# Patient Record
Sex: FEMALE | Race: WHITE | NOT HISPANIC OR LATINO | Employment: OTHER | URBAN - METROPOLITAN AREA
[De-identification: names, ages, dates, MRNs, and addresses within clinical notes are randomized per-mention and may not be internally consistent; named-entity substitution may affect disease eponyms.]

---

## 2017-05-19 ENCOUNTER — OFFICE VISIT (OUTPATIENT)
Dept: NEUROLOGY | Facility: CLINIC | Age: 22
End: 2017-05-19
Payer: COMMERCIAL

## 2017-05-19 VITALS
WEIGHT: 210.13 LBS | SYSTOLIC BLOOD PRESSURE: 135 MMHG | BODY MASS INDEX: 33.77 KG/M2 | HEIGHT: 66 IN | DIASTOLIC BLOOD PRESSURE: 89 MMHG | HEART RATE: 113 BPM

## 2017-05-19 DIAGNOSIS — R42 DIZZINESS: ICD-10-CM

## 2017-05-19 DIAGNOSIS — G43.009 MIGRAINE WITHOUT AURA AND WITHOUT STATUS MIGRAINOSUS, NOT INTRACTABLE: Primary | ICD-10-CM

## 2017-05-19 DIAGNOSIS — R51.9 WORSENING HEADACHES: ICD-10-CM

## 2017-05-19 DIAGNOSIS — G44.86 CERVICOGENIC HEADACHE: ICD-10-CM

## 2017-05-19 DIAGNOSIS — R41.3 MEMORY DISTURBANCE: ICD-10-CM

## 2017-05-19 DIAGNOSIS — G47.9 TROUBLE IN SLEEPING: ICD-10-CM

## 2017-05-19 PROCEDURE — 99204 OFFICE O/P NEW MOD 45 MIN: CPT | Mod: S$GLB,,, | Performed by: NURSE PRACTITIONER

## 2017-05-19 PROCEDURE — 1160F RVW MEDS BY RX/DR IN RCRD: CPT | Mod: S$GLB,,, | Performed by: NURSE PRACTITIONER

## 2017-05-19 PROCEDURE — 99999 PR PBB SHADOW E&M-NEW PATIENT-LVL IV: CPT | Mod: PBBFAC,,, | Performed by: NURSE PRACTITIONER

## 2017-05-19 RX ORDER — TOPIRAMATE 25 MG/1
TABLET ORAL
Qty: 70 TABLET | Refills: 1 | Status: SHIPPED | OUTPATIENT
Start: 2017-05-19 | End: 2021-03-22

## 2017-05-19 RX ORDER — CITALOPRAM 20 MG/1
20 TABLET, FILM COATED ORAL DAILY
Refills: 0 | COMMUNITY
Start: 2017-03-14 | End: 2021-03-16

## 2017-05-19 RX ORDER — SUMATRIPTAN SUCCINATE 100 MG/1
TABLET ORAL
Refills: 3 | COMMUNITY
Start: 2017-05-03 | End: 2017-10-02 | Stop reason: ALTCHOICE

## 2017-05-19 RX ORDER — ETONOGESTREL AND ETHINYL ESTRADIOL .12; .015 MG/D; MG/D
INSERT, EXTENDED RELEASE VAGINAL
Refills: 6 | COMMUNITY
Start: 2017-05-03 | End: 2021-07-22

## 2017-05-19 RX ORDER — TOPIRAMATE 25 MG/1
TABLET ORAL
Refills: 0 | COMMUNITY
Start: 2017-03-14 | End: 2017-05-19 | Stop reason: ALTCHOICE

## 2017-05-19 RX ORDER — RIZATRIPTAN BENZOATE 10 MG/1
10 TABLET ORAL
Qty: 9 TABLET | Refills: 3 | Status: SHIPPED | OUTPATIENT
Start: 2017-05-19 | End: 2021-03-16

## 2017-05-19 RX ORDER — CLARITHROMYCIN 250 MG/1
250 TABLET, FILM COATED ORAL 2 TIMES DAILY
Refills: 0 | COMMUNITY
Start: 2017-03-15 | End: 2017-05-19 | Stop reason: ALTCHOICE

## 2017-05-19 RX ORDER — BENZONATATE 200 MG/1
CAPSULE ORAL
Refills: 0 | COMMUNITY
Start: 2017-03-15 | End: 2017-05-19 | Stop reason: ALTCHOICE

## 2017-05-19 RX ORDER — MONTELUKAST SODIUM 10 MG/1
10 TABLET ORAL DAILY
Refills: 0 | COMMUNITY
Start: 2017-03-15 | End: 2017-05-19 | Stop reason: ALTCHOICE

## 2017-05-19 NOTE — PROGRESS NOTES
Subjective:    Patient ID: Zaira Reeder   MRN: [id number]  Date: 05/19/2017    Referred By: Aaareferral Self    Chief Complaint: Consult      History of Present Illness:   21 y.o. female with ***,  presents *** for ***.     Onset ... Previous Hx of HA...Aura...Frequency ... Intensity (range)...Duration... #HD/month ... Last HA: ... Time & Mode of Onset... Location...Quality ... Radiation    Associated Symptoms:   · Brainstem Symptoms - vertigo, dysarthria, diplopia, ataxia, numbness/tingling (uni or bi), decreased LOC  · N, V, D, photo/phonophobia, tinnitus, scalp pain, changes in vision, scintillations, diplopia, eye pain, jaw pain, weakness, changes in concentration, worse with activity, neck pain     · Cluster HA - facial flushing/sweating, swollen/droopy eyelid, swelling around eyes, excessive tearing, rhinorrhea, nasal congestion, red eyes  · Increased ICP - whooshing sounds, pulsatile tinnitus, visual spots/blotches     Triggers - food, weather, etoh, menstrual cycle, environmental factors, smells, bright lights, smells, vigorous exercise, visual strain, heat, exertion     Aggravating Factors  Alleviating Factors  Head Trauma? Infection? Fever? Cancer? Pregnancy?  Recent Changes - sleep, exercise, weight, diet, meds, work, lifestyle      Sleep-    Treatments Tried and Response    Family Hx of Migraines, aneurysms, brain tumors     Positives in bold: Hx of Kidney Stones, asthma, GI bleed, osteoporosis, CAD/MI, CVA/TIA, DM      Social History***  Alcohol -   Smoke -  Recreational Drug Use-     Current Medications:    Current Outpatient Prescriptions:     benzonatate (TESSALON) 200 MG capsule, take 1 capsule by mouth three times a day if needed for cough, Disp: , Rfl: 0    citalopram (CELEXA) 20 MG tablet, Take 20 mg by mouth once daily., Disp: , Rfl: 0    clarithromycin (BIAXIN) 250 MG tablet, Take 250 mg by mouth 2 (two) times daily., Disp: , Rfl: 0    montelukast (SINGULAIR) 10 mg tablet, Take 10 mg  "by mouth once daily., Disp: , Rfl: 0    NUVARING 0.12-0.015 mg/24 hr vaginal ring, INSERT 1 RING INTO THE VAGINA EVERY MONTH LEAVE IN PLACE FOR 3 WEEKS, REMOVE FOR 1 WEEK, Disp: , Rfl: 6    sumatriptan (IMITREX) 100 MG tablet, TAKE 1 TABLET BY MOUTH ONCE A DAY AS NEEDED FOR MIGRAINE, Disp: , Rfl: 3    topiramate (TOPAMAX) 25 MG tablet, TAKE 1 TABLET BY MOUTH NIGHTLY AT BEDTIME FOR 1 WEEK THEN TAKE 2 TABLETS AT BEDTIME THEREAFTER, Disp: , Rfl: 0    Review of Systems     Objective:    Vitals:  /89  Pulse (!) 113  Ht 5' 6" (1.676 m)  Wt 95.3 kg (210 lb 1.6 oz)  BMI 33.91 kg/m2    Physical Exam   Constitutional:   She appears well-developed and well-nourished. She is well groomed  HENT:    Head: Atraumatic, oral and nasal mucosa intact.  Frontalis was ***, temporalis was ***   Eyes: Conjunctivae and EOM are normal. Pupils are equal, round, and reactive to light   Neck: Neck supple. No carotid bruit heard bilaterally.Occiput and trapezius NTTP***   Cardiovascular: Normal rate and normal heart sounds. No murmur heard.  Pulmonary/Chest: Effort normal and breath sounds normal  Musculoskeletal: Normal range of motion. No joint stiffness. No vertebral point tenderness.  Skin: Skin is warm and dry.  Psychiatric: Normal mood and affect.     Neuro exam:    Mental status:  The patient is awake, alert, and oriented to person, place and time.  Language is intact. Naming, repetition and fluency are clear and intact.   Remote and recent memory are intact  No findings to suggest executive dysfuntion    Patient has adequate insight    Mood is stable    Cranial Nerves:  Fundoscopic examination does not reveal any occult papilledema.    Pupils are equal and reactive to light.    Extraocular movements are intact and without nystagmus.    Visual fields are full to confrontation testing.   Facial movement is symmetric.  Facial sensation is intact.    Hearing is normal.   Uvula in midline. Tongue in midline without " fasiculation  FROM of neck in all (6) directions, shoulder shrug symmetrical.    Coordination:     Finger to nose - normal and symmetric bilaterally   Heel to shin test - normal and symmetric bilaterally     Motor:  Normal muscle bulk and symmetry. No fasciculations were noted.   Tremor/pronator drift ***apparent.    strength and finger extension strength was ***  RUE:appropriate against gravity and medium force as tested ***/5  LUE: appropriate against gravity and medium force as tested ***/5  RLE:appropriate against gravity and medium force as tested ***/5              LLE: appropriate against gravity and medium force as tested ***/5    Reflexes:  Right Brachioradialis {0, 1+, 2+, 3+, 4+, TRACE:19280}  Left Brachioradialis {0, 1+, 2+, 3+, 4+, TRACE:54543}  Right Biceps {0, 1+, 2+, 3+, 4+, TRACE:80030}  Left Biceps {0, 1+, 2+, 3+, 4+, TRACE:93230}  Right Triceps {0, 1+, 2+, 3+, 4+, TRACE:34115}  Left Triceps {0, 1+, 2+, 3+, 4+, TRACE:07371}  Right Patellar{0, 1+, 2+, 3+, 4+, TRACE:86864}  Left Patellar {0, 1+, 2+, 3+, 4+, TRACE:29768}  Right Achilles {0, 1+, 2+, 3+, 4+, TRACE:09230}  Left Achilles {0, 1+, 2+, 3+, 4+, TRACE:97890}                          Plantar flexion bilat   Finn was ***     Sensory:  RUE  {INTACT/DEFICIT:41379} {sensation:11875}  LUE {INTACT/DEFICIT:01519} {sensation:61388}    RLE {INTACT/DEFICIT:67057} {sensation:20043}  LLE {INTACT/DEFICIT:39334} {sensation:54485}    Gait:   Romberg - negative   Normal gait  Tandem, Heel, and Toe Walk -     Review of Data:   Labs:  Imaging:  Note: I have independently reviewed any/all imaging/labs/tests and agree with the report (s) as documented.  Any discrepancies will be as noted/demarcated by free text.  CATARINO NP 5/19/2017      Assessment:  Zaira Reeder is a 21 y.o. female          There are no diagnoses linked to this encounter.      Plan:  I have discussed realistic goals of care with patient at length as well as medication options, and need  for lifestyle adjustment. I have explained that treatment will take time. We have agreed that the goal will be to reduce frequency/intensity/quantity of HA, not to be completely HA free. I have explained my non narcotic policy regarding headache treatment.    Patient to track frequency of headaches.  Patient agreeable to work on lifestyle adjustments.    Discussed potential for teratogenicity with treatment, patient understands if her family planning status should change she will contact office immediately and we will safely adjust medications as needed.     All questions and concerns addressed.  Patient verbalizes understanding and is agreeable to the plan.        *** was directly involved in this patient's care.  He was briefed, then directly conducted an independent H&P based on the above findings.  His input to follow through the EMR.      ***, was available during today's encounter. Any change to plan will appear in the EMR          CC: Primary Doctor Rere Petit, FNP-C  [facility]  [phone]

## 2017-05-19 NOTE — PROGRESS NOTES
Subjective:    Patient ID: Zaira Reeder   MRN: [id number]  Date: 05/22/2017    Referred By: Aaareferral Self    Chief Complaint: Consult    History of Present Illness:   21 y.o. female with TMJ,  presents alone for evaluation of headaches.  States she has had migraines for years, and has always taken Imitrex which made her migraines go away.   Was started on Topamax 25 mg two years ago by doctor in [place].  Current episode has been going on since last Thursday:  Thursday - started with migraine, right frontal pain radiating to right occipitalis, took an imitrex, went to bed   Fri- woke up and migraine continued   Saturday - woke up migraine was no longer there, by 10-ish migraine had returned ferociously, took an imitrex and went to take a nap, went to the [facility], she drove, she was at graduation, felt hot, stood up to go get a water, felt like her vision turned white, had an exacerbation of her pain (rated 1000/10), had tunnel vision, and passed out, her  caught her, then went home and slept     Sunday - was speaking to her parents, and they felt she was not speaking appropriately, and migraine continued.  Went to [facility] and got a Toradol and phenergan shot, she sat there for 10-15 minutes then felt better, vision in her right eye was blurry. Was totally fine until that night 9 PM went to grocery store and passed out again.  Denies hitting head on either passing out event.        Since Sunday, she has felt mentally and physically exhausted since, has been unable to go to work all week   Has had a constant dull pain in her head since     Typical migraine pain - is right frontal pain radiating to right occiput, throbbing lasting a few hours associated with some nausea, sensitivity to light    Associated Symptoms - pushes on right temple, jaw pain - always relieved with sumatriptan 100 mg x 1   Triggers - unable to identify     Typical migraine symptoms - pain on right side, right  frontal/occipital, throbbing      Aggravating Factors - stress, change in sleep pattern   Alleviating Factors - sleep, imitrex   Head Trauma? Infection? Fever? Cancer? Pregnancy? <-- NONE  Recent Changes - sleep, exercise, weight, diet, meds, work, lifestyle  <-- new job, is no longer working nights     Sleep- sleeps well, snores (both birth sisters have SARIAH)    Treatments Tried and Response  Topamax - stopped taking medication and migraines have increased in frequency and duration   Sumatriptan - lost efficacy     2016 - 2 migraines all year (on Topamax 25 mg)   January 2017 - increased stress - 2 migraines   February - 0 March - 0 (stopped taking Topamax 25 mg in March because she felt it was not helping)  April- 5 migraines   May 5-6 migraines     Family Hx of Migraines, aneurysms, brain tumors <-- adopted/ unsure of family history      Positives in bold: Hx of Kidney Stones, asthma, GI bleed (ulcers), osteoporosis, CAD/MI, CVA/TIA, DM      Social History  Alcohol - denies  Smoke - denies  Recreational Drug Use- denies   No caffeine     Current Medications:    citalopram (CELEXA) 20 MG tablet, Take 20 mg by mouth once daily., Disp: , Rfl: 0    NUVARING 0.12-0.015 mg/24 hr vaginal ring, INSERT 1 RING INTO THE VAGINA EVERY MONTH LEAVE IN PLACE FOR 3 WEEKS, REMOVE FOR 1 WEEK, Disp: , Rfl: 6    rizatriptan (MAXALT) 10 MG tablet, Take 1 tablet (10 mg total) by mouth as needed for Migraine., Disp: 9 tablet, Rfl: 3    sumatriptan (IMITREX) 100 MG tablet, TAKE 1 TABLET BY MOUTH ONCE A DAY AS NEEDED FOR MIGRAINE, Disp: , Rfl: 3    topiramate (TOPAMAX) 25 MG tablet, 1 tab PM x 1 week, 2 tabs PM x 1 week, 3 tabs PM x 1 week, 4 tabs PM x 1 week., Disp: 70 tablet, Rfl: 1    Review of Systems   Review of Systems   Constitutional: Positive for activity change and fatigue. Negative for chills, diaphoresis and fever.   HENT: Negative for congestion, ear pain, facial swelling, hearing loss, rhinorrhea, sinus pressure,  "tinnitus and voice change.    Eyes: Positive for photophobia and visual disturbance. Negative for pain, discharge and redness.   Respiratory: Negative for chest tightness and shortness of breath.    Cardiovascular: Negative for chest pain and palpitations.   Gastrointestinal: Positive for nausea. Negative for vomiting.   Genitourinary: Negative for difficulty urinating and dysuria.   Musculoskeletal: Positive for gait problem (feels she has been leaning towards the right), neck pain and neck stiffness. Negative for back pain and myalgias.   Skin: Negative for pallor.   Allergic/Immunologic: Negative for immunocompromised state.   Neurological: Positive for dizziness, syncope, light-headedness and headaches. Negative for facial asymmetry, speech difficulty, weakness and numbness.   Psychiatric/Behavioral: Positive for dysphoric mood and sleep disturbance (snores). Negative for agitation, behavioral problems, confusion, decreased concentration and hallucinations. The patient is nervous/anxious. The patient is not hyperactive.      Objective:    Vitals:  /89   Pulse (!) 113   Ht 5' 6" (1.676 m)   Wt 95.3 kg (210 lb 1.6 oz)   BMI 33.91 kg/m²     Physical Exam   Constitutional:   She appears well-developed and well-nourished. She is well groomed  HENT:    Head: Atraumatic, oral and nasal mucosa intact.  Frontalis was NTTP, temporalis was NTTP   Eyes: Conjunctivae and EOM are normal. Pupils are equal, round, and reactive to light   Neck: Neck supple. Occiput and trapezius TTP on right side only   Cardiovascular: Normal rate and normal heart sounds. No murmur heard.  Pulmonary/Chest: Effort normal and breath sounds normal  Musculoskeletal: Normal range of motion. No joint stiffness. No vertebral point tenderness.  Skin: Skin is warm and dry.  Psychiatric: Normal mood and affect.     Neuro exam:    Mental status:  The patient is awake, alert, and oriented to person, place and time.  Language is intact.   Remote " and recent memory are intact  Patient has adequate insight    Mood is stable    Cranial Nerves:  Fundoscopic examination does not reveal any occult papilledema.    Pupils are equal and reactive to light.    Extraocular movements are intact and without nystagmus.    Visual fields are full to confrontation testing.   Facial movement is symmetric.  Facial sensation is intact.    Hearing is normal.   Uvula in midline. Mallampati III. Tongue in midline without fasiculation  DROM of neck in all (6) directions, secondary to soreness/pain  shoulder shrug symmetrical.    Coordination:     Finger to nose - normal and symmetric bilaterally   Heel to shin test - normal and symmetric bilaterally     Motor:  Normal muscle bulk and symmetry. No fasciculations were noted.   Tremor/pronator drift not apparent.    strength and finger extension strength was strong and symmetric   RUE:appropriate against gravity and medium force as tested 5/5  LUE: appropriate against gravity and medium force as tested 5/5  RLE:appropriate against gravity and medium force as tested 5/5              LLE: appropriate against gravity and medium force as tested 5/5    Reflexes:  Right Brachioradialis 2+  Left Brachioradialis 2+  Right Biceps 2+  Left Biceps 2+  Right Triceps 2+  Left Triceps 2+  Right Patellar2+  Left Patellar 2+  Right Achilles 2+  Left Achilles 2+                          Plantar flexion bilat   Finn was negative      Sensory:  RUE  intact light touch and temperature  LUE intact light touch and temperature    RLE intact light touch and temperature  LLE intact light touch and temperature    Gait:   Romberg - slight sway   Normal gait  Tandem, Heel, and Toe Walk - slight sway on tandem walk       Assessment:  Zaira Reeder is a 21 y.o. female with a long standing history of migraines.  Was previously taking Topamax 25 mg, discontinued medication two months ago and migraines have increased in frequency and intensity since.  Had  recent change in migraine pattern with 2 syncopal episodes, will order MRI Brain w/wo contrast to rule out secondary cause for change in migraines.      Diagnoses and all orders for this visit:    Migraine without aura and without status migrainosus, not intractable  -     rizatriptan (MAXALT) 10 MG tablet; Take 1 tablet (10 mg total) by mouth as needed for Migraine.  -     topiramate (TOPAMAX) 25 MG tablet; 1 tab PM x 1 week, 2 tabs PM x 1 week, 3 tabs PM x 1 week, 4 tabs PM x 1 week.  -     MRI Brain W WO Contrast; Future  -     CBC auto differential; Future  -     Comprehensive metabolic panel; Future  -     TSH; Future  -     T4, FREE; Future  -     T3, FREE; Future  -     MAGNESIUM; Future  -     VITAMIN D; Future  -     Ambulatory consult to Sleep Disorders  -     Ambulatory Referral to Physical/Occupational Therapy    Worsening headaches  -     MRI Brain W WO Contrast; Future  -     Ambulatory consult to Sleep Disorders    Trouble in sleeping  -     Ambulatory consult to Sleep Disorders        - Snores - has two birth sisters with sleep apnea     Dizziness  -     MRI Brain W WO Contrast; Future    Memory disturbance  -     Ambulatory consult to Sleep Disorders    Cervicogenic headache  -     Ambulatory Referral to Physical/Occupational Therapy    Plan:  - MRI Brain W/WO Contrast  - D/C Imitrex as no longer effective   - For abortive - maxalt 10mg tab at onset of migraine, can repeat in 2 hours if needed   No more than 2 tabs/day or for over 3 days/week   - For preventive - restart Topamax 25 mg    Week 1 - 1 tab at night   Week 2 - 2 tabs at night   Week 3 - 3 tabs at night   Week 4 - 4 tabs at night   - Having trouble with sleep, snores - referral to Sleep Disorders placed   - For complaints of neck pain/stiffness - agreeable to physical therapy for neck pain   - Follow-up in 2 months or as needed     Future Considerations - FLETCHER    I have discussed realistic goals of care with patient at length as well as  medication options, and need for lifestyle adjustment. I have explained that treatment will take time. We have agreed that the goal will be to reduce frequency/intensity/quantity of HA, not to be completely HA free. I have explained my non narcotic policy regarding headache treatment.    Patient to track frequency of headaches.  Patient agreeable to work on lifestyle adjustments.    Discussed potential for teratogenicity with treatment, patient understands if her family planning status should change she will contact office immediately and we will safely adjust medications as needed. Currently utilizing Nuva Ring for birth control.      All questions and concerns addressed.  Patient verbalizes understanding and is agreeable to the plan.     Dr. Strickland was available during today's encounter. Any change to plan will appear in the EMR       SHANIKA Santiago-C  [facility]  [phone]

## 2017-05-19 NOTE — MR AVS SNAPSHOT
[facility]  [address]  [facility] [address]  Phone: [phone]  Fax: [phone]                  Zaira Reeder   2017 8:00 AM   Office Visit    Description:  Female : 1995   Provider:  SHANIKA Santiago   Department:  [facility]           Reason for Visit     Consult           Diagnoses this Visit        Comments    Migraine without aura and without status migrainosus, not intractable    -  Primary     Worsening headaches         Trouble in sleeping         Dizziness         Memory disturbance         Cervicogenic headache                To Do List           Future Appointments        Provider Department Dept Phone    2017 1:30 PM [facility] MRI1 [facility] [phone]    2017 10:30 AM SHANIKA Santiago - Neurology [phone]    2017 2:00 PM Debra Leroy MD [facility] [phone]      Goals (5 Years of Data)     None       These Medications        Disp Refills Start End    rizatriptan (MAXALT) 10 MG tablet 9 tablet 3 2017    Take 1 tablet (10 mg total) by mouth as needed for Migraine. - Oral    [facility] - [address] Ph #: [phone]       topiramate (TOPAMAX) 25 MG tablet 70 tablet 1 2017     1 tab PM x 1 week, 2 tabs PM x 1 week, 3 tabs PM x 1 week, 4 tabs PM x 1 week.    Ph[facility]: [facility] - [address] Ph #: [phone]         [facility]     [facility] Care [facility] -  Assistance  Unless otherwise directed by your provider, please contact [facility], our nurse care line that is available for  assistance.     Registered nurses in the [facility] provide: appointment scheduling, clinical advisement, health education, and other advisory services.  Call: [phone] (toll free)               Medications           Message  "regarding Medications     Verify the changes and/or additions to your medication regime listed below are the same as discussed with your clinician today.  If any of these changes or additions are incorrect, please notify your healthcare provider.        START taking these NEW medications        Refills    rizatriptan (MAXALT) 10 MG tablet 3    Sig: Take 1 tablet (10 mg total) by mouth as needed for Migraine.    Class: Normal    Route: Oral    topiramate (TOPAMAX) 25 MG tablet 1    Si tab PM x 1 week, 2 tabs PM x 1 week, 3 tabs PM x 1 week, 4 tabs PM x 1 week.    Class: Normal      STOP taking these medications     benzonatate (TESSALON) 200 MG capsule take 1 capsule by mouth three times a day if needed for cough    clarithromycin (BIAXIN) 250 MG tablet Take 250 mg by mouth 2 (two) times daily.    montelukast (SINGULAIR) 10 mg tablet Take 10 mg by mouth once daily.           Verify that the below list of medications is an accurate representation of the medications you are currently taking.  If none reported, the list may be blank. If incorrect, please contact your healthcare provider. Carry this list with you in case of emergency.           Current Medications     citalopram (CELEXA) 20 MG tablet Take 20 mg by mouth once daily.    NUVARING 0.12-0.015 mg/24 hr vaginal ring INSERT 1 RING INTO THE VAGINA EVERY MONTH LEAVE IN PLACE FOR 3 WEEKS, REMOVE FOR 1 WEEK    rizatriptan (MAXALT) 10 MG tablet Take 1 tablet (10 mg total) by mouth as needed for Migraine.    sumatriptan (IMITREX) 100 MG tablet TAKE 1 TABLET BY MOUTH ONCE A DAY AS NEEDED FOR MIGRAINE    topiramate (TOPAMAX) 25 MG tablet 1 tab PM x 1 week, 2 tabs PM x 1 week, 3 tabs PM x 1 week, 4 tabs PM x 1 week.           Clinical Reference Information           Your Vitals Were     BP Pulse Height Weight BMI    135/89 113 5' 6" (1.676 m) 95.3 kg (210 lb 1.6 oz) 33.91 kg/m2      Blood Pressure          Most Recent Value    BP  135/89      Allergies as of " 5/19/2017     No Known Allergies      Immunizations Administered on Date of Encounter - 5/19/2017     None      Orders Placed During Today's Visit      Normal Orders This Visit    Ambulatory consult to Sleep Disorders     Ambulatory Referral to Physical/Occupational Therapy     Future Labs/Procedures Expected by Expires    CBC auto differential  5/19/2017 7/18/2018    Comprehensive metabolic panel  5/19/2017 7/18/2018    MAGNESIUM  5/19/2017 7/18/2018    MRI Brain W WO Contrast  5/19/2017 5/19/2018    T3, FREE  5/19/2017 7/18/2018    T4, FREE  5/19/2017 7/18/2018    TSH  5/19/2017 7/18/2018    VITAMIN D  5/19/2017 7/18/2018      [facility] Sign-Up     Activating your [facility] account is as easy as 1-2-3!     1) Visit [email or web address], select Sign Up Now, enter this activation code and your date of birth, then select Next.  [email or web address]  Expires: 7/3/2017  9:31 AM      2) Create a username and password to use when you visit [facility] in the future and select a security question in case you lose your password and select Next.    3) Enter your e-mail address and click Sign Up!    Additional Information  If you have questions, please e-mail [email or web address] or call [phone] to talk to our [facility] staff. Remember, [facility] is NOT to be used for urgent needs. For medical emergencies, dial 911.         Language Assistance Services     ATTENTION: Language assistance services are available, free of charge. Please call [phone].      ATENCIÓN: Si habla español, tiene a soto disposición servicios gratuitos de asistencia lingüística. Llame al [phone].     IGLESIA Ý: N?u b?n nói Ti?ng Vi?t, có các d?ch v? h? tr? ngôn ng? mi?n phí dành cho b?n. G?i s? [phone].         [facility] complies with applicable Federal civil rights laws and does not discriminate on the basis of race, color, national origin, age, disability, or sex.

## 2017-05-26 ENCOUNTER — HOSPITAL ENCOUNTER (EMERGENCY)
Facility: HOSPITAL | Age: 22
Discharge: HOME | End: 2017-05-27
Attending: EMERGENCY MEDICINE | Admitting: EMERGENCY MEDICINE
Payer: COMMERCIAL

## 2017-05-26 VITALS
BODY MASS INDEX: 33.75 KG/M2 | HEART RATE: 120 BPM | OXYGEN SATURATION: 97 % | HEIGHT: 66 IN | TEMPERATURE: 99 F | RESPIRATION RATE: 19 BRPM | WEIGHT: 210 LBS

## 2017-05-26 DIAGNOSIS — S93.401A MILD ANKLE SPRAIN, RIGHT, INITIAL ENCOUNTER: Primary | ICD-10-CM

## 2017-05-26 DIAGNOSIS — S99.911A ANKLE INJURY, RIGHT, INITIAL ENCOUNTER: ICD-10-CM

## 2017-05-26 PROCEDURE — 99283 EMERGENCY DEPT VISIT LOW MDM: CPT

## 2017-05-26 PROCEDURE — 99283 EMERGENCY DEPT VISIT LOW MDM: CPT | Mod: ,,, | Performed by: EMERGENCY MEDICINE

## 2017-05-26 RX ORDER — IBUPROFEN 600 MG/1
600 TABLET ORAL
Status: DISCONTINUED | OUTPATIENT
Start: 2017-05-27 | End: 2017-05-26

## 2017-05-26 RX ORDER — ACETAMINOPHEN 500 MG
1000 TABLET ORAL
Status: COMPLETED | OUTPATIENT
Start: 2017-05-27 | End: 2017-05-27

## 2017-05-27 PROCEDURE — 25000003 PHARM REV CODE 250: Performed by: EMERGENCY MEDICINE

## 2017-05-27 RX ADMIN — ACETAMINOPHEN 1000 MG: 500 TABLET ORAL at 12:05
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
## 2017-06-06 ENCOUNTER — HOSPITAL ENCOUNTER (OUTPATIENT)
Dept: RADIOLOGY | Facility: HOSPITAL | Age: 22
Discharge: HOME | End: 2017-06-06
Attending: NURSE PRACTITIONER
Payer: COMMERCIAL

## 2017-06-06 DIAGNOSIS — R42 DIZZINESS: ICD-10-CM

## 2017-06-06 DIAGNOSIS — G43.009 MIGRAINE WITHOUT AURA AND WITHOUT STATUS MIGRAINOSUS, NOT INTRACTABLE: ICD-10-CM

## 2017-06-06 DIAGNOSIS — R51.9 WORSENING HEADACHES: ICD-10-CM

## 2017-06-06 PROCEDURE — A9585 GADOBUTROL INJECTION: HCPCS | Performed by: NURSE PRACTITIONER

## 2017-06-06 PROCEDURE — 70553 MRI BRAIN STEM W/O & W/DYE: CPT | Mod: TC

## 2017-06-06 PROCEDURE — 25500020 PHARM REV CODE 255: Performed by: NURSE PRACTITIONER

## 2017-06-06 PROCEDURE — 70553 MRI BRAIN STEM W/O & W/DYE: CPT | Mod: 26,,, | Performed by: RADIOLOGY

## 2017-06-06 RX ORDER — GADOBUTROL 604.72 MG/ML
10 INJECTION INTRAVENOUS
Status: COMPLETED | OUTPATIENT
Start: 2017-06-06 | End: 2017-06-06

## 2017-06-06 RX ADMIN — GADOBUTROL 10 ML: 604.72 INJECTION INTRAVENOUS at 02:06

## 2017-07-06 ENCOUNTER — OFFICE VISIT (OUTPATIENT)
Dept: NEUROLOGY | Facility: CLINIC | Age: 22
End: 2017-07-06
Payer: COMMERCIAL

## 2017-07-06 VITALS
HEART RATE: 119 BPM | WEIGHT: 202.63 LBS | SYSTOLIC BLOOD PRESSURE: 136 MMHG | DIASTOLIC BLOOD PRESSURE: 95 MMHG | HEIGHT: 66 IN | BODY MASS INDEX: 32.56 KG/M2

## 2017-07-06 DIAGNOSIS — G44.86 CERVICOGENIC HEADACHE: ICD-10-CM

## 2017-07-06 DIAGNOSIS — G43.009 MIGRAINE WITHOUT AURA AND WITHOUT STATUS MIGRAINOSUS, NOT INTRACTABLE: Primary | ICD-10-CM

## 2017-07-06 DIAGNOSIS — G47.9 TROUBLE IN SLEEPING: ICD-10-CM

## 2017-07-06 PROCEDURE — 96372 THER/PROPH/DIAG INJ SC/IM: CPT | Mod: S$GLB,,, | Performed by: NURSE PRACTITIONER

## 2017-07-06 PROCEDURE — 99999 PR PBB SHADOW E&M-EST. PATIENT-LVL III: CPT | Mod: PBBFAC,,, | Performed by: NURSE PRACTITIONER

## 2017-07-06 PROCEDURE — 99213 OFFICE O/P EST LOW 20 MIN: CPT | Mod: 25,S$GLB,, | Performed by: NURSE PRACTITIONER

## 2017-07-06 RX ORDER — TOPIRAMATE 100 MG/1
100 TABLET, FILM COATED ORAL DAILY
Qty: 30 TABLET | Refills: 5 | Status: SHIPPED | OUTPATIENT
Start: 2017-07-06 | End: 2021-03-16 | Stop reason: SDUPTHER

## 2017-07-06 RX ORDER — TOPIRAMATE 50 MG/1
50 TABLET, FILM COATED ORAL 2 TIMES DAILY
Qty: 60 TABLET | Refills: 5 | Status: SHIPPED | OUTPATIENT
Start: 2017-07-06 | End: 2018-07-06

## 2017-07-06 RX ORDER — ELETRIPTAN HYDROBROMIDE 40 MG/1
40 TABLET, FILM COATED ORAL
Qty: 9 TABLET | Refills: 5 | Status: SHIPPED | OUTPATIENT
Start: 2017-07-06 | End: 2017-07-27 | Stop reason: SDUPTHER

## 2017-07-06 RX ORDER — KETOROLAC TROMETHAMINE 30 MG/ML
60 INJECTION, SOLUTION INTRAMUSCULAR; INTRAVENOUS
Status: COMPLETED | OUTPATIENT
Start: 2017-07-06 | End: 2017-07-06

## 2017-07-06 RX ADMIN — KETOROLAC TROMETHAMINE 60 MG: 30 INJECTION, SOLUTION INTRAMUSCULAR; INTRAVENOUS at 11:07

## 2017-07-06 NOTE — PROGRESS NOTES
Established Patient   SUBJECTIVE:  Patient ID: Zaira Reeder is a 22 y.o. female.  Chief Complaint: Follow-up    History of Present Illness:  Zaira Reeder is a 22 y.o. female with history of TMJ, who presents to the clinic with a friend for follow-up of headaches.     Recommendations made at last Office Visit on 5/19/2017:  - MRI Brain W/WO Contrast  - Lab work ordered   - D/C Imitrex as no longer effective   - For abortive - maxalt 10 mg tab at onset of migraine, can repeat in 2 hours if needed   No more than 2 tabs/day or for over 3 days/week   - For preventive - restart Topamax 25 mg - titrate to 100 mg nightly   - Referral to Sleep disorders  - Referral to PT    - Follow-up in 2 months or as needed     07/06/2017 - Interval History:  - MRI Brain normal   - Labwork not completed   - Maxalt 10 mg does not work   - For prevention - is taking Topiramate 100 mg daily   - Has had 5 migraines since last visit, each lasting 2-3 days   - Is currently in Physical Therapy for her neck - states she is going twice per week and has home excercises to do daily   - Has appointment to see sleep medicine on 7/11/17   - States her neck feels very heavy despite physical therapy   - Has elevated BP and HR in clinic today - per patient she states she monitors her BP/HR regularly and states it is usually within normal limits, denies chest pain   - Is complaining of 5/10 pain in office - requests nerve block to be done, will schedule appointment in 2 weeks for her to come back for nerve block as she was 12 minutes late to her 30 min appt - she is agreeable to this      Treatments Tried and Response  Topamax 100 mg - helping   Sumatriptan - lost efficacy   Maxalt - no help   Relpax - prescribed today   Topamax 200 mg - recommended today     Initial CAMERON HPI:  21 y.o. female with TMJ,  presents alone for evaluation of headaches.  States she has had migraines for years, and has always taken Imitrex which made her migraines go  away.   Was started on Topamax 25 mg two years ago by doctor in [place].  Current episode has been going on since last Thursday:  Thursday - started with migraine, right frontal pain radiating to right occipitalis, took an imitrex, went to bed   Fri- woke up and migraine continued   Saturday - woke up migraine was no longer there, by 10-philomena migraine had returned ferociously, took an imitrex and went to take a nap, went to the [facility], she drove, she was at graduation, felt hot, stood up to go get a water, felt like her vision turned white, had an exacerbation of her pain (rated 1000/10), had tunnel vision, and passed out, her  caught her, then went home and slept     Sunday - was speaking to her parents, and they felt she was not speaking appropriately, and migraine continued.  Went to [facility] and got a Toradol and phenergan shot, she sat there for 10-15 minutes then felt better, vision in her right eye was blurry. Was totally fine until that night 9 PM went to grocery store and passed out again.  Denies hitting head on either passing out event.        Since Sunday, she has felt mentally and physically exhausted since, has been unable to go to work all week . Has had a constant dull pain in her head since   Typical migraine pain - is right frontal pain radiating to right occiput, throbbing lasting a few hours associated with some nausea, sensitivity to light  Associated Symptoms - pushes on right temple, jaw pain - always relieved with sumatriptan 100 mg x 1   Triggers - unable to identify   Typical migraine symptoms - pain on right side, right frontal/occipital, throbbing   Aggravating Factors - stress, change in sleep pattern   Alleviating Factors - sleep, imitrex   Head Trauma? Infection? Fever? Cancer? Pregnancy? <-- NONE  Recent Changes - sleep, exercise, weight, diet, meds, work, lifestyle  <-- new job, is no longer working nights   Sleep- sleeps well, snores (both birth sisters have  "SARIAH)    Headache Timeline:  2016 - 2 migraines all year (on Topamax 25 mg)   January 2017 - increased stress - 2 migraines   February 2017- 0  March 2017- 0 (stopped taking Topamax 25 mg in March because she felt it was not helping)  April 2017-  5 migraines   May 2017 - 5-6 migraines   June 2017 -  6 migraines     Current Medications:     citalopram (CELEXA) 20 MG tablet, Take 20 mg by mouth once daily., Disp: , Rfl: 0    NUVARING 0.12-0.015 mg/24 hr vaginal ring, INSERT 1 RING INTO THE VAGINA EVERY MONTH LEAVE IN PLACE FOR 3 WEEKS, REMOVE FOR 1 WEEK, Disp: , Rfl: 6    sumatriptan (IMITREX) 100 MG tablet, TAKE 1 TABLET BY MOUTH ONCE A DAY AS NEEDED FOR MIGRAINE, Disp: , Rfl: 3    topiramate (TOPAMAX) 25 MG tablet, 1 tab PM x 1 week, 2 tabs PM x 1 week, 3 tabs PM x 1 week, 4 tabs PM x 1 week., Disp: 70 tablet, Rfl: 1    eletriptan (RELPAX) 40 MG tablet, Take 1 tablet (40 mg total) by mouth as needed. may repeat in 2 hours if necessary, Disp: 9 tablet, Rfl: 5    rizatriptan (MAXALT) 10 MG tablet, Take 1 tablet (10 mg total) by mouth as needed for Migraine., Disp: 9 tablet, Rfl: 3    topiramate (TOPAMAX) 100 MG tablet, Take 1 tablet (100 mg total) by mouth once daily., Disp: 30 tablet, Rfl: 5    topiramate (TOPAMAX) 50 MG tablet, Take 1 tablet (50 mg total) by mouth 2 (two) times daily., Disp: 60 tablet, Rfl: 5    OBJECTIVE:  Vitals:  BP (!) 136/95   Pulse (!) 119   Ht 5' 6" (1.676 m)   Wt 91.9 kg (202 lb 9.6 oz)   BMI 32.70 kg/m²     Physical Exam:  Constitutional:   She appears well-developed and well-nourished. She is well groomed.  NAD.  Complaining of 5/10 pain, laughing and joking with friend in office   HENT:    Head: Normocephalic and atraumatic, oral and nasal mucosa intact.  Frontalis was NTTP, temporalis was NTTP   Eyes: Conjunctivae and EOM are normal.   Musculoskeletal: Normal range of motion. No joint stiffness. No vertebral point tenderness.  Skin: Skin is warm and dry.  Psychiatric: Normal " "mood and affect.     Neuro exam:    Mental status:  The patient is awake, alert, and oriented to person, place and time.  Language is intact and fluent  Remote and recent memory are intact  Normal attention and concentration  Mood is stable    Cranial Nerves:  Extraocular movements are intact and without nystagmus.    Visual fields are full to confrontation testing.   Facial movement is symmetric.  Facial sensation is intact.    Hearing is intact to finger rub   Shoulder shrug symmetrical.    Motor:  Normal muscle bulk and symmetry. No fasciculations were noted.   RUE:appropriate against gravity and medium force as tested 5/5  LUE: appropriate against gravity and medium force as tested 5/5  RLE:appropriate against gravity and medium force as tested 5/5              LLE: appropriate against gravity and medium force as tested 5/5    Reflexes:  Right Patellar2+  Left Patellar 2+                          Sensory:  RUE  intact light touch and temperature  LUE intact light touch and temperature    RLE intact light touch and temperature  LLE intact light touch and temperature    Gait Normal     Review of Data:   Labs:  Did not have labwork done   Imagin2017 - MRI Brain W/WO Contrast   Radiology Impression - "No evidence of acute intracranial pathology.  Electronically signed by resident: DARLENE PINK MD. Date: 17. Time: 16:06  As the supervising and teaching physician, I personally reviewed the images and resident's interpretation and I agree with the findings.  Electronically signed by: CHELSEY DOMINGUEZ MD. Date: 17. Time: 16:26"     Note: I have independently reviewed any/all imaging/labs/tests and agree with the report (s) as documented.  Any discrepancies will be as noted/demarcated by free text.  SIENNA EASLEY 2017    Focused examination was undertaken today.     ASSESSMENT:  1. Migraine without aura and without status migrainosus, not intractable    2. Trouble in sleeping    3. Cervicogenic " headache      PLAN:  - Increase dose of Topamax    Continue 100 mg nightly    Add 25 mg tablet in the morning    After 2 weeks, can increase morning dose to 50 mg if no benefit, but no side effects   - toradol 60 mg IM injection administered in clinic - she tolerated injection well - no adverse effects present after 10 minutes of monitoring   - D/C Maxalt   - For abortive therapy - relpax 40 mg    Take at onset of migraine, can repeat in 2 hrs if needed.  No more than 2 tabs/day or 3 days/week   - Continue to monitor BP and HR - may consider addition of propranolol for migraine prevention if tachycardia persists   - Keep appointment with sleep medicine next week   - Encouraged her to have labwork completed prior to next office visit   - Continue tracking headaches   - Discussed goals of therapy are to decrease the frequency, intensity, and duration of headaches  - RTC in 2 weeks for nerve block and 2 months for follow-up     Orders Placed This Encounter    eletriptan (RELPAX) 40 MG tablet    topiramate (TOPAMAX) 100 MG tablet    topiramate (TOPAMAX) 50 MG tablet    ketorolac injection 60 mg     Discussed potential for teratogenicity with treatment, patient understands if her family planning status should change she will contact office immediately and we will safely adjust medications as needed.  She uses NuvaRing for contraception and states she and her  do not want to get pregnant at this time. Discussed addition of second form of contraception with increased dose of Topamax as this has the potential to decrease the effectiveness of NuvaRing.  She is agreeable.    I spent 15 minutes face-to-face with the patient with >50% of the time spent with counseling and education regarding:  - results of data, diagnosis, and recommendations stated above  - risks and benefits of relpax and increased dose of topamax   - importance of monitoring BP/HR regularly   - importance of her keeping her appt with sleep  medicine as she has a strong family history of sleep apnea - untreated SARIAH could be contributing to her headaches and elevated BP/HR   - importance of healthy diet, regular exercise and sleep hygiene in the treatment of headaches      The patient verbalizes understanding and agreement with the treatment plan. Questions were sought and answered to her stated verbal satisfaction.      Stephie Petit, LEATHAP-C  [facility]   [phone]    Dr. Strickland was available during today's encounter.

## 2017-07-11 ENCOUNTER — OFFICE VISIT (OUTPATIENT)
Dept: SLEEP MEDICINE | Facility: CLINIC | Age: 22
End: 2017-07-11
Payer: COMMERCIAL

## 2017-07-11 VITALS
DIASTOLIC BLOOD PRESSURE: 77 MMHG | HEIGHT: 66 IN | BODY MASS INDEX: 32.34 KG/M2 | WEIGHT: 201.25 LBS | HEART RATE: 114 BPM | SYSTOLIC BLOOD PRESSURE: 118 MMHG

## 2017-07-11 DIAGNOSIS — G47.33 OSA (OBSTRUCTIVE SLEEP APNEA): Primary | ICD-10-CM

## 2017-07-11 PROCEDURE — 99204 OFFICE O/P NEW MOD 45 MIN: CPT | Mod: S$GLB,,, | Performed by: PSYCHIATRY & NEUROLOGY

## 2017-07-11 PROCEDURE — 99999 PR PBB SHADOW E&M-EST. PATIENT-LVL III: CPT | Mod: PBBFAC,,, | Performed by: PSYCHIATRY & NEUROLOGY

## 2017-07-11 NOTE — PROGRESS NOTES
Zaira Reeder  was seen at the request of  Stephie Petit FNP for sleep evaluation.    07/11/2017 INITIAL HISTORY OF PRESENT ILLNESS:  Zaira Reeder is a 22 y.o. female is here to be evaluated for a sleep disorder.       CHIEF COMPLAINT:      The patient's complaints include excessive daytime sleepiness, excessive daytime fatigue, snoring,  gasping for air in sleep and interrupted sleep since  Last year.    Gained 50 lbs.    Had TIA - like episode - incoherent speech, rt eye vision loss, dizziness (falling to the right) + bad headache in May 2017 that resolved spontaneously same day.     Previous h/o migraine    Reports  dry mouth and sore throat  Reports nasal congestion   Denies  morning headaches  Denies  interrupted sleep  Denies frequent leg movements  Denies symptoms concerning for parasomnia    The ESS (Savage Sleepiness Score) taken on initial visit is 11 /24    The patient had tonsillectomy in the past      SLEEP ROUTINE AND LIFESTYLE 07/11/2017 :    Occupation:NP in trauma    Bed partner: dental schopol     Time to bed - wake up time on a workday : 12:30 PM after work to 8 PM  Time to bed - wake up time on a day off: 10 PM to  7-8 AM  Sleep onset la3-4tency: 1 hr  Disruptions or awakenings: 30-60 min  Time to fall back into sleep: 30 min   Perceived sleep quality: 0/5  Perceived total sleep time:  7-8  hours.  Daytime naps: 2-3    Exercise routine: sometimes  Caffeine:       PREVIOUS SLEEP STUDIES:     none      DME:       PAST MEDICAL HISTORY:    Active Ambulatory Problems     Diagnosis Date Noted    No Active Ambulatory Problems     Resolved Ambulatory Problems     Diagnosis Date Noted    No Resolved Ambulatory Problems     No Additional Past Medical History                PAST SURGICAL HISTORY:    No past surgical history on file.      FAMILY HISTORY:                Family History   Problem Relation Age of Onset    Adopted: Yes    Sleep apnea Sister        SOCIAL HISTORY:         "  Tobacco:   History   Smoking Status    Never Smoker   Smokeless Tobacco    Not on file       alcohol use:    History   Alcohol Use No                   ALLERGIES:  Review of patient's allergies indicates:  No Known Allergies    CURRENT MEDICATIONS:    Current Outpatient Prescriptions   Medication Sig Dispense Refill    citalopram (CELEXA) 20 MG tablet Take 20 mg by mouth once daily.  0    eletriptan (RELPAX) 40 MG tablet Take 1 tablet (40 mg total) by mouth as needed. may repeat in 2 hours if necessary 9 tablet 5    NUVARING 0.12-0.015 mg/24 hr vaginal ring INSERT 1 RING INTO THE VAGINA EVERY MONTH LEAVE IN PLACE FOR 3 WEEKS, REMOVE FOR 1 WEEK  6    sumatriptan (IMITREX) 100 MG tablet TAKE 1 TABLET BY MOUTH ONCE A DAY AS NEEDED FOR MIGRAINE  3    topiramate (TOPAMAX) 100 MG tablet Take 1 tablet (100 mg total) by mouth once daily. 30 tablet 5    topiramate (TOPAMAX) 25 MG tablet 1 tab PM x 1 week, 2 tabs PM x 1 week, 3 tabs PM x 1 week, 4 tabs PM x 1 week. 70 tablet 1    topiramate (TOPAMAX) 50 MG tablet Take 1 tablet (50 mg total) by mouth 2 (two) times daily. 60 tablet 5    rizatriptan (MAXALT) 10 MG tablet Take 1 tablet (10 mg total) by mouth as needed for Migraine. 9 tablet 3     No current facility-administered medications for this visit.                       REVIEW OF SYSTEMS:   Sleep related symptoms as per HPI    reports weight gain 50 lbs - coincided with starting working nights  Reports dyspnea  Reports palpitations  Reports occasional acid reflux   Denies polyuria  Reports  mood diturbance  Denies  anemia  Denies  muscle pain  Denies  Gait imbalance    Otherwise, a balance of 10 systems reviewed is negative.    PHYSICAL EXAM:  /77 (BP Location: Left arm, Patient Position: Sitting, BP Method: Automatic)   Pulse (!) 114   Ht 5' 6" (1.676 m)   Wt 91.3 kg (201 lb 4.5 oz)   BMI 32.49 kg/m²   GENERAL: Overweight body habitus, well groomed.  HEENT:   HEENT:  Conjunctivae are " "non-erythematous; Pupils equal, round, and reactive to light; Nose is symmetrical; Nasal mucosa is pink and moist; Septum is midline; Inferior turbinates are hypertrophied; Nasal airflow is normal; Posterior pharynx is pink; Modified Mallampati:III-IV; Posterior palate is low; Tonsils not visualized; Uvula is wide and elongated;Tongue is enlarged; Dentition is fair; No TMJ tenderness; Jaw opening and protrusion without click and without discomfort.  NECK: Supple. Neck circumference is 15 inches. No thyromegaly. No palpable nodes.     SKIN: On face and neck: No abrasions, no rashes, no lesions.  No subcutaneous nodules are palpable.  RESPIRATORY: Chest is clear to auscultation.  Normal chest expansion and non-labored breathing at rest.  CARDIOVASCULAR: Normal S1, S2.  No murmurs, gallops or rubs. No carotid bruits bilaterally.  No edema. No clubbing. No cyanosis.    NEURO: Oriented to time, place and person. Normal attention span and concentration. Gait normal.    PSYCH: Affect is full. Mood is normal  MUSCULOSKELETAL: Moves 4 extremities. Gait normal.         Using [facility]: yes      ASSESSMENT:    1. SARIAH (obstructive sleep apnea). The patient symptomatically has  excessive daytime sleepiness, snoring, excessive daytime fatigue, gasping for air in sleep and interrupted sleep  with exam findings of "a crowded oral airway and elevated body mass index. This warrants further investigation for possible obstructive sleep apnea.          PLAN:    Diagnostic: Polysomnogram HOME. The nature of this procedure and its indication was discussed with the patient. she would  like to come discuss PSG results.    Weight loss strategies were discussed in detail         More than 25 minutes of this 45 minutes visit was spent in counseling: during our discussion today, we talked about the etiology of  SARIAH as well as the potential ramifications of untreated sleep apnea, which could include daytime sleepiness, hypertension, heart " disease and/or stroke.  We discussed potential treatment options, which could include weight loss, body positioning, continuous positive airway pressure (CPAP), or referral for surgical consideration. Meanwhile, she  is urged to avoid supine sleep, weight gain and alcoholic beverages since all of these can worsen SARIAH.     Precautions: The patient was advised to abstain from driving should he feel sleepy or drowsy.    Follow up: MD/NP  after the sleep study has been completed.     Thank you for allowing me the opportunity to participate in the care of your patient.    This visit summary will be sent to referring provider via inbasket

## 2017-07-11 NOTE — PATIENT INSTRUCTIONS
SLEEP LAB ([facility] or [facility]) will contact you to schedulethe sleep study. Their number is [phone] (ext 1). Please call them if you do not hear from them in 10 business days from now.  [facility] is located on 7th floor of the [facility];     SLEEP CLINIC (my assistant) will call you when the sleep study results are ready - if you have not heard from us by 2 weeks from the date of the study, please call [phone] (ext 2) or you can use [facility] to contact me.    You are advised to abstain from driving should you feel sleepy or drowsy.

## 2017-07-11 NOTE — LETTER
July 11, 2017      Stephie Petit, [facility]  [address]  [facility] [address]           [facility]  [address]  [facility] [address]  Phone: [phone]          Patient: Zaira Reeder   MR Number: [id number]   YOB: 1995   Date of Visit: 7/11/2017       Dear Stephie Petit:    Thank you for referring Zaira Reeder to me for evaluation. Attached you will find relevant portions of my assessment and plan of care.    If you have questions, please do not hesitate to call me. I look forward to following Zaira Reeder along with you.    Sincerely,    Debra Leroy MD    Enclosure  CC:  No Recipients    If you would like to receive this communication electronically, please contact [email or web address] or [phone] to request more information on [employer] Link access.    For providers and/or their staff who would like to refer a patient to [facility], please contact us through our one-stop-shop provider referral line, [facility] , at [phone].    If you feel you have received this communication in error or would no longer like to receive these types of communications, please e-mail [email or web address]

## 2017-07-20 ENCOUNTER — TELEPHONE (OUTPATIENT)
Dept: SLEEP MEDICINE | Facility: OTHER | Age: 22
End: 2017-07-20

## 2017-07-25 ENCOUNTER — TELEPHONE (OUTPATIENT)
Dept: SLEEP MEDICINE | Facility: OTHER | Age: 22
End: 2017-07-25

## 2017-07-26 ENCOUNTER — PATIENT MESSAGE (OUTPATIENT)
Dept: NEUROLOGY | Facility: CLINIC | Age: 22
End: 2017-07-26

## 2017-07-26 DIAGNOSIS — G43.009 MIGRAINE WITHOUT AURA AND WITHOUT STATUS MIGRAINOSUS, NOT INTRACTABLE: ICD-10-CM

## 2017-07-27 RX ORDER — ELETRIPTAN HYDROBROMIDE 40 MG/1
40 TABLET, FILM COATED ORAL
Qty: 9 TABLET | Refills: 5 | Status: SHIPPED | OUTPATIENT
Start: 2017-07-27 | End: 2021-03-16

## 2017-07-28 ENCOUNTER — TELEPHONE (OUTPATIENT)
Dept: SLEEP MEDICINE | Facility: OTHER | Age: 22
End: 2017-07-28

## 2017-07-28 NOTE — TELEPHONE ENCOUNTER
Left messages to schedule her home sleep study.  No response,sent out a message through [facility] to schedule.

## 2017-08-03 ENCOUNTER — TELEPHONE (OUTPATIENT)
Dept: SLEEP MEDICINE | Facility: OTHER | Age: 22
End: 2017-08-03

## 2017-08-13 ENCOUNTER — OFFICE VISIT (OUTPATIENT)
Dept: URGENT CARE | Facility: CLINIC | Age: 22
End: 2017-08-13
Payer: COMMERCIAL

## 2017-08-13 VITALS
BODY MASS INDEX: 28.09 KG/M2 | SYSTOLIC BLOOD PRESSURE: 138 MMHG | WEIGHT: 179 LBS | DIASTOLIC BLOOD PRESSURE: 88 MMHG | HEART RATE: 118 BPM | HEIGHT: 67 IN | OXYGEN SATURATION: 100 % | TEMPERATURE: 100 F | RESPIRATION RATE: 16 BRPM

## 2017-08-13 DIAGNOSIS — R11.0 NAUSEA: Primary | ICD-10-CM

## 2017-08-13 DIAGNOSIS — G43.011 INTRACTABLE MIGRAINE WITHOUT AURA AND WITH STATUS MIGRAINOSUS: ICD-10-CM

## 2017-08-13 PROCEDURE — 96372 THER/PROPH/DIAG INJ SC/IM: CPT | Mod: S$GLB,,, | Performed by: NURSE PRACTITIONER

## 2017-08-13 PROCEDURE — 3008F BODY MASS INDEX DOCD: CPT | Mod: S$GLB,,, | Performed by: NURSE PRACTITIONER

## 2017-08-13 PROCEDURE — 99204 OFFICE O/P NEW MOD 45 MIN: CPT | Mod: 25,S$GLB,, | Performed by: NURSE PRACTITIONER

## 2017-08-13 RX ORDER — PROMETHAZINE HYDROCHLORIDE 25 MG/1
25 TABLET ORAL EVERY 6 HOURS PRN
Qty: 15 TABLET | Refills: 0 | Status: SHIPPED | OUTPATIENT
Start: 2017-08-13 | End: 2021-07-22 | Stop reason: SDUPTHER

## 2017-08-13 RX ORDER — ONDANSETRON 4 MG/1
4 TABLET, ORALLY DISINTEGRATING ORAL
Status: DISCONTINUED | OUTPATIENT
Start: 2017-08-13 | End: 2017-08-13

## 2017-08-13 RX ORDER — KETOROLAC TROMETHAMINE 30 MG/ML
60 INJECTION, SOLUTION INTRAMUSCULAR; INTRAVENOUS
Status: COMPLETED | OUTPATIENT
Start: 2017-08-13 | End: 2017-08-13

## 2017-08-13 RX ORDER — BUTALBITAL, ACETAMINOPHEN AND CAFFEINE 50; 325; 40 MG/1; MG/1; MG/1
1 TABLET ORAL EVERY 6 HOURS PRN
Qty: 20 TABLET | Refills: 0 | Status: SHIPPED | OUTPATIENT
Start: 2017-08-13 | End: 2017-09-12

## 2017-08-13 RX ADMIN — KETOROLAC TROMETHAMINE 60 MG: 30 INJECTION, SOLUTION INTRAMUSCULAR; INTRAVENOUS at 04:08

## 2017-08-13 NOTE — PROGRESS NOTES
Subjective:       Patient ID: Zaira Reeder is a 22 y.o. female.    Chief Complaint: Headache    Patient states she started having headache last  Last night and it is making her nauseated.Patient does see neurologist for headache, but medicine she got is not helping. Has taken Topamax and Sumatriptan without relief.      Headache    This is a recurrent problem. The current episode started yesterday. The problem occurs constantly. The problem has been gradually worsening. The pain is located in the right unilateral and occipital region. The pain radiates to the right neck. The pain quality is similar to prior headaches. The quality of the pain is described as aching and throbbing. The pain is at a severity of 6/10. The pain is moderate. Associated symptoms include blurred vision, dizziness, a fever, insomnia, nausea, neck pain, phonophobia, photophobia and scalp tenderness. Pertinent negatives include no numbness, seizures, tinnitus, vomiting or weakness. Associated symptoms comments: Right eye blurred vision. Nothing (everything bothers it.) aggravates the symptoms. She has tried triptans and darkened room (Medications given to her by neurologist and PCP) for the symptoms. The treatment provided mild relief. Her past medical history is significant for migraine headaches.     Review of Systems   Constitution: Positive for fever. Negative for chills and weakness.   HENT: Positive for headaches. Negative for congestion and tinnitus.    Eyes: Positive for blurred vision and photophobia.   Skin: Negative for rash.   Musculoskeletal: Positive for neck pain.   Gastrointestinal: Positive for nausea. Negative for vomiting.   Neurological: Positive for dizziness. Negative for disturbances in coordination, numbness and seizures.   Psychiatric/Behavioral: Negative for altered mental status. The patient has insomnia. The patient is not nervous/anxious.    All other systems reviewed and are negative.      Objective:       Physical Exam   Constitutional: She is oriented to person, place, and time. She appears well-developed and well-nourished.   HENT:   Head: Normocephalic and atraumatic.   Right Ear: External ear normal.   Left Ear: External ear normal.   Nose: Nose normal.   Mouth/Throat: Oropharynx is clear and moist.   Eyes: Conjunctivae and EOM are normal. Pupils are equal, round, and reactive to light.   Neck: Normal range of motion. Neck supple. Muscular tenderness present.   Cardiovascular: Normal rate, regular rhythm and normal heart sounds.    Pulmonary/Chest: Effort normal and breath sounds normal.   Abdominal: Soft.   Musculoskeletal: Normal range of motion.   Neurological: She is alert and oriented to person, place, and time. She displays normal reflexes. No cranial nerve deficit. She exhibits normal muscle tone. Coordination normal.   Skin: Skin is warm and dry. Capillary refill takes less than 2 seconds.   Psychiatric: She has a normal mood and affect. Her behavior is normal. Judgment and thought content normal.   Nursing note and vitals reviewed.      Assessment:       1. Nausea    2. Intractable migraine without aura and with status migrainosus        Plan:       Zaira was seen today for headache.    Diagnoses and all orders for this visit:    Nausea  -     ondansetron disintegrating tablet 4 mg; Take 1 tablet (4 mg total) by mouth one time.  -     promethazine (PHENERGAN) 25 MG tablet; Take 1 tablet (25 mg total) by mouth every 6 (six) hours as needed for Nausea.    Intractable migraine without aura and with status migrainosus  -     ketorolac injection 60 mg; Inject 2 mLs (60 mg total) into the muscle one time.  -     butalbital-acetaminophen-caffeine -40 mg (FIORICET, ESGIC) -40 mg per tablet; Take 1 tablet by mouth every 6 (six) hours as needed for Pain or Headaches.

## 2017-08-13 NOTE — PATIENT INSTRUCTIONS
Please follow up with your Neurologist for your migraines.    Migraine Headache  This often severe type of headache is different from other types of headaches in that symptoms other than pain occur with the headache. Nausea and vomiting, lightheadedness, sensitivity to light (photophobia), and other visual disturbances are common migraine symptoms. The pain may last from a few hours to several days. It is not clear why migraines occur but certain factors called triggers can raise the risk of having a migraine attack. A migraine may be triggered by emotional stress or depression, or by hormone changes during the menstrual cycle. Other triggers include birth control pills, overuse of migraine medicines, alcohol or caffeine, foods with tyramine (such as aged cheese and wine), eyestrain, weather changes, missed meals, or too little or too much sleep.  Home care  Follow these tips when taking care of yourself at home:  · Dont drive yourself home if you were given pain medicine for your headache or are having visual symptoms. Instead, have someone else drive you home. Try to sleep when you get home. You should feel much better when you wake up.  · Cold can help ease migraine symptoms. Put an ice pack on your forehead or at the base of your skull. Put heat on the back of your neck to help ease any neck spasm.  · Drink only clear liquids or eat a light diet until your symptoms get better. This will help you avoid nausea and vomiting.  How to prevent migraines  Pay attention to what seems to trigger your headache. Try to avoid the triggers when you can. If you have frequent headaches, consider keeping a headache diary. In it, write down what you were doing, feeling, or eating in the hours before each headache. Show this to your healthcare provider to help find the cause of your headaches.  If stress seems to be a trigger for your headaches, figure out what is causing stress in your life. Learn new ways to handle your  stress. Ideas include regular exercise, biofeedback, self-hypnosis, yoga, and meditation. Talk with your healthcare provider to find out more information about managing stress. Many books and digital media are also available on this subject.  Tyramine is a substance found in many foods. It can trigger a migraine in some people. These foods contain tyramine:  · Chocolate  · Yogurt  · All cheeses, but especially aged cheeses  · Smoked or pickled fish and meat, including herring, caviar, bologna, pepperoni, and salami  · Liver  · Avocados  · Bananas  · Figs  · Raisins  · Red wine  Try staying away from these foods for 1 to 2 months to see if you have fewer headaches.  How to treat future headaches  · Take time out at the first sign of a headache, if possible. Find a quiet, dark, comfortable place to sit or lie down. Let yourself relax or sleep.  · Put an ice pack on your forehead or on the area of greatest pain. A heating pad and massage may help if you are having a muscle spasm and tightness in your neck.  · If you have been prescribed a medicine to stop a migraine headache, use this at the first warning sign of the headache for best results. First signs may be an aura or pain.  · If you need to take medicine often for your migraine, talk with your healthcare provider about other ways to prevent your headaches.  Follow-up care  Follow up with your healthcare provider, or as advised. Talk with your provider if you have frequent headaches. He or she can figure out a treatment plan. Ask if you can have medicine to take at home the next time you get a bad headache. This may keep you from having to visit the emergency department in the future. You may need to see a headache specialist (neurologist) if you continue to have headaches.  When to seek medical advice  Call your healthcare provider right away if any of these occur:  · Your head pain gets worse, or doesnt get better within 24 hours  · You cant keep liquids down  (repeated vomiting)  · Pain in your sinuses, ears, or throat  · Fever of 100.4º F (38º C) or higher, or as directed by your healthcare provider  · Stiff neck  · Extreme drowsiness, confusion, or fainting  · Dizziness, or dizziness with spinning sensation (vertigo)  · Weakness in an arm or leg, or on one side of your face  · Difficulty talking or seeing  Date Last Reviewed: 8/1/2016  © 7131-0209 [employer]. [address] [place] [id number]. All rights reserved. This information is not intended as a substitute for professional medical care. Always follow your healthcare professional's instructions.

## 2017-08-21 ENCOUNTER — TELEPHONE (OUTPATIENT)
Dept: SLEEP MEDICINE | Facility: OTHER | Age: 22
End: 2017-08-21

## 2017-09-18 ENCOUNTER — TELEPHONE (OUTPATIENT)
Dept: NEUROLOGY | Facility: CLINIC | Age: 22
End: 2017-09-18

## 2017-09-29 ENCOUNTER — PATIENT MESSAGE (OUTPATIENT)
Dept: NEUROLOGY | Facility: CLINIC | Age: 22
End: 2017-09-29

## 2017-10-02 RX ORDER — SUMATRIPTAN SUCCINATE 100 MG/1
TABLET ORAL
Qty: 9 TABLET | Refills: 2 | Status: SHIPPED | OUTPATIENT
Start: 2017-10-02 | End: 2018-01-02 | Stop reason: SDUPTHER

## 2017-10-14 ENCOUNTER — OFFICE VISIT (OUTPATIENT)
Dept: URGENT CARE | Facility: CLINIC | Age: 22
End: 2017-10-14
Payer: COMMERCIAL

## 2017-10-14 VITALS
DIASTOLIC BLOOD PRESSURE: 94 MMHG | HEIGHT: 67 IN | WEIGHT: 166 LBS | BODY MASS INDEX: 26.06 KG/M2 | TEMPERATURE: 98 F | RESPIRATION RATE: 18 BRPM | SYSTOLIC BLOOD PRESSURE: 127 MMHG | HEART RATE: 102 BPM | OXYGEN SATURATION: 98 %

## 2017-10-14 DIAGNOSIS — J32.9 SINUSITIS, UNSPECIFIED CHRONICITY, UNSPECIFIED LOCATION: Primary | ICD-10-CM

## 2017-10-14 DIAGNOSIS — B00.2 HERPES VIRUS INFECTION OF ORAL MUCOSA: ICD-10-CM

## 2017-10-14 PROCEDURE — 99214 OFFICE O/P EST MOD 30 MIN: CPT | Mod: 25,S$GLB,, | Performed by: PHYSICIAN ASSISTANT

## 2017-10-14 PROCEDURE — 96372 THER/PROPH/DIAG INJ SC/IM: CPT | Mod: S$GLB,,, | Performed by: PHYSICIAN ASSISTANT

## 2017-10-14 RX ORDER — ACYCLOVIR 50 MG/G
OINTMENT TOPICAL
Qty: 15 G | Refills: 0 | Status: SHIPPED | OUTPATIENT
Start: 2017-10-14 | End: 2018-10-14

## 2017-10-14 RX ORDER — VALACYCLOVIR HYDROCHLORIDE 1 G/1
1000 TABLET, FILM COATED ORAL 3 TIMES DAILY
Qty: 21 TABLET | Refills: 0 | Status: SHIPPED | OUTPATIENT
Start: 2017-10-14 | End: 2021-03-22

## 2017-10-14 RX ORDER — AMOXICILLIN AND CLAVULANATE POTASSIUM 875; 125 MG/1; MG/1
1 TABLET, FILM COATED ORAL 2 TIMES DAILY
Qty: 20 TABLET | Refills: 0 | Status: SHIPPED | OUTPATIENT
Start: 2017-10-14 | End: 2017-10-24

## 2017-10-14 RX ORDER — BETAMETHASONE SODIUM PHOSPHATE AND BETAMETHASONE ACETATE 3; 3 MG/ML; MG/ML
6 INJECTION, SUSPENSION INTRA-ARTICULAR; INTRALESIONAL; INTRAMUSCULAR; SOFT TISSUE
Status: COMPLETED | OUTPATIENT
Start: 2017-10-14 | End: 2017-10-14

## 2017-10-14 RX ADMIN — BETAMETHASONE SODIUM PHOSPHATE AND BETAMETHASONE ACETATE 6 MG: 3; 3 INJECTION, SUSPENSION INTRA-ARTICULAR; INTRALESIONAL; INTRAMUSCULAR; SOFT TISSUE at 09:10

## 2017-10-14 NOTE — PATIENT INSTRUCTIONS
Sinusitis (Antibiotic Treatment)    The sinuses are air-filled spaces within the bones of the face. They connect to the inside of the nose. Sinusitis is an inflammation of the tissue lining the sinus cavity. Sinus inflammation can occur during a cold. It can also be due to allergies to pollens and other particles in the air. Sinusitis can cause symptoms of sinus congestion and fullness. A sinus infection causes fever, headache and facial pain. There is often green or yellow drainage from the nose or into the back of the throat (post-nasal drip). You have been given antibiotics to treat this condition.  Home care:  · Take the full course of antibiotics as instructed. Do not stop taking them, even if you feel better.  · Drink plenty of water, hot tea, and other liquids. This may help thin mucus. It also may promote sinus drainage.  · Heat may help soothe painful areas of the face. Use a towel soaked in hot water. Or,  the shower and direct the hot spray onto your face. Using a vaporizer along with a menthol rub at night may also help.   · An expectorant containing guaifenesin may help thin the mucus and promote drainage from the sinuses.  · Over-the-counter decongestants may be used unless a similar medicine was prescribed. Nasal sprays work the fastest. Use one that contains phenylephrine or oxymetazoline. First blow the nose gently. Then use the spray. Do not use these medicines more often than directed on the label or symptoms may get worse. You may also use tablets containing pseudoephedrine. Avoid products that combine ingredients, because side effects may be increased. Read labels. You can also ask the pharmacist for help. (NOTE: Persons with high blood pressure should not use decongestants. They can raise blood pressure.)  · Over-the-counter antihistamines may help if allergies contributed to your sinusitis.    · Do not use nasal rinses or irrigation during an acute sinus infection, unless told to by  your health care provider. Rinsing may spread the infection to other sinuses.  · Use acetaminophen or ibuprofen to control pain, unless another pain medicine was prescribed. (If you have chronic liver or kidney disease or ever had a stomach ulcer, talk with your doctor before using these medicines. Aspirin should never be used in anyone under 18 years of age who is ill with a fever. It may cause severe liver damage.)  · Don't smoke. This can worsen symptoms.  Follow-up care  Follow up with your healthcare provider or our staff if you are not improving within the next week.  When to seek medical advice  Call your healthcare provider if any of these occur:  · Facial pain or headache becoming more severe  · Stiff neck  · Unusual drowsiness or confusion  · Swelling of the forehead or eyelids  · Vision problems, including blurred or double vision  · Fever of 100.4ºF (38ºC) or higher, or as directed by your healthcare provider  · Seizure  · Breathing problems  · Symptoms not resolving within 10 days  Date Last Reviewed: 4/13/2015  © 7206-3397 [employer]. [address] [place] [address]. All rights reserved. This information is not intended as a substitute for professional medical care. Always follow your healthcare professional's instructions.        Understanding Cold Sores  Cold sores are small blisters or sores on the lip or sometimes inside the mouth. Many people get them from time to time. Cold sores usually are not serious, and they usually heal in a week or two. They are caused by 2 related viruses, herpes simplex type 1 and 2. These viruses spread very easily. Many people have one or both of these viruses in their body. More than 4 in every 5 people are infected with herpes simplex type 1. Once you have the virus that causes cold sores, it stays in your body for the rest of your life. But it can be inactive for long periods.  What causes a cold sore?  Cold sores are usually caused by  herpes simplex virus type 1. Less often, they are caused by herpes simplex virus type 2. Herpes simplex virus type 2 is the more common cause of genital sores. The herpes viruses can enter the body through a break in the skin such as a scrape. Or they may enter through mucous membranes such as the lips or mouth. Some ways to get the viruses include:  · Kissing someone who has a cold sore  · Sharing a drinking glass, eating utensils, or lip balm with someone who has a cold sore  · Having oral sex with someone who has a cold sore  A  baby can also get the infection at birth.  If you have a herpes virus, you can to pass it along even when you dont have a sore.  Cold sores flare up occasionally. Things that can cause an outbreak include:  · Sun exposure  · Fever  · Stress or exhaustion  · Menstruation  · Skin irritation  · Another unrelated Illness such as pneumonia, urinary infection, or cancer  What are the symptoms of a cold sore?  Symptoms can include:  · A blister-like sore or cluster of sores. These often occur at the edge of the lips but may appear inside the mouth.  · Skin redness around the sores.  · Pain or itching in the area of the outbreak. Often the pain or itching develops 12 to 24 hours before the sore become visible.  · Flu-like symptoms, including swollen glands, headache, body ache, or fever. These typically occur only at the time of the first infection.  Cold sores may also occur on fingers. They may rarely infect the eyes, a serious possible complication.  Some people have symptoms a day or two before an outbreak. They may feel tenderness, burning, itching, or tingling before a cold sore appears. Cold sores tend to come back in the same area that they first appeared.  How are cold sores treated?  Treatment for cold sores focuses on relieving and shortening symptoms. For people with frequent outbreaks, treatment works to decrease how often future episodes.  Treatments may  include:  · Prescription or over-the-counter pain medicines. These can help with discomfort, especially if sores are inside the mouth.  · Antiviral medicines. These may be pills that are taken by mouth or a cream to apply to sores. They may help shorten an outbreak and reduce the severity of symptoms.  They may be used to help prevent future outbreaks if you have disabling recurrent infections.  · Self-care such as extra rest and drinking more fluids. These may help relieve the flu-like symptoms of a first outbreak.  How are cold sores diagnosed?  Your healthcare provider makes the diagnosis mainly by looking at the sores and doing a clinical exam.  This may be confirmed by swab tests or blood tests.  How can I prevent cold sores?  You can help reduce the spread of the herpes viruses that cause cold sores. This can help both you and others avoid getting cold sores. Follow these tips:  · Do not kiss others if you have a cold sore. Also avoid kissing someone with a cold sore.  · Do not share eating utensils, lip balm, razors, or towels with someone who has a cold sore.  · Wash your hands after touching the area of a cold sore. The herpes virus can be carried from your face to your hands when you touch the area of a cold sore. When this happens, wash your hands thoroughly, for at least 20 seconds. When you cant wash with soap and water, use an alcohol-based hand .  · Disinfect things you touch often, such as phones and keyboards.    · If you feel a cold sore coming on, do the same things you would do when a cold sore is present to avoid spreading the virus.  · Use condoms to help prevent passing on the viruses through sex.  What are the possible complications of a cold sore?  Cold sores usually go away by themselves within 2 weeks. For most people cold sores are not serious. The viruses that cause cold sores can cause more serious illness, though. People who have a weak immune system may get more serious  infections from herpes viruses. These include people being treated for cancer or who have HIV disease. Babies may also become very ill from a herpes infection.      When should I call my healthcare provider?  Call your healthcare provider right away if you have any of these:  · Fever of 100.4°F (38°C) or higher, or as directed  · Pain that gets worse  · You cannot eat or drink because of painful sores  · Symptoms dont get better within 5 to 7 days  · Blisters spread beyond the mouth or lip to areas on the chest, arms, face, or legs   Date Last Reviewed: 3/28/2016  © 2825-1816 [employer]. [address] [place] [address]. All rights reserved. This information is not intended as a substitute for professional medical care. Always follow your healthcare professional's instructions.      Please follow up with your Primary care provider within 2-5 days if your signs and symptoms have not resolved or worsen.     If your condition worsens or fails to improve we recommend that you receive another evaluation at the emergency room immediately or contact your primary medical clinic to discuss your concerns.   You must understand that you have received an Urgent Care treatment only and that you may be released before all of your medical problems are known or treated. You, the patient, will arrange for follow up care as instructed.

## 2017-10-14 NOTE — PROGRESS NOTES
Subjective:       Patient ID: Zaira Reeder is a 22 y.o. female.    Chief Complaint: Sinus Problem and Mouth Lesions (lower lip 1 day )    Sinus Problem   This is a new problem. The current episode started in the past 7 days. There has been no fever. Her pain is at a severity of 0/10. She is experiencing no pain. Associated symptoms include sinus pressure and sneezing. Pertinent negatives include no chills, congestion, coughing, ear pain, headaches, hoarse voice, shortness of breath or sore throat. Past treatments include nothing. The treatment provided mild relief.     Review of Systems   Constitution: Negative for chills, fever and malaise/fatigue.   HENT: Positive for sinus pressure and sneezing. Negative for congestion, ear pain, hoarse voice and sore throat.    Eyes: Positive for discharge and redness.   Cardiovascular: Negative for chest pain, dyspnea on exertion and leg swelling.   Respiratory: Negative for cough, shortness of breath, sputum production and wheezing.    Musculoskeletal: Negative for myalgias.   Gastrointestinal: Negative for abdominal pain and nausea.   Neurological: Negative for headaches.       Objective:      Physical Exam   Constitutional: She is oriented to person, place, and time. She appears well-developed and well-nourished. She is cooperative.  Non-toxic appearance. She does not appear ill. No distress.   HENT:   Head: Normocephalic and atraumatic.   Right Ear: Hearing, external ear and ear canal normal. A middle ear effusion is present.   Left Ear: Hearing, external ear and ear canal normal. A middle ear effusion is present.   Nose: Mucosal edema and rhinorrhea present. No nasal deformity. No epistaxis. Right sinus exhibits maxillary sinus tenderness. Right sinus exhibits no frontal sinus tenderness. Left sinus exhibits maxillary sinus tenderness. Left sinus exhibits no frontal sinus tenderness.   Mouth/Throat: Uvula is midline, oropharynx is clear and moist and mucous  membranes are normal. No trismus in the jaw. Normal dentition. No uvula swelling. No posterior oropharyngeal erythema.       Cold sore noted L lower lip   Eyes: Conjunctivae and lids are normal. No scleral icterus.   Sclera clear bilat   Neck: Trachea normal, full passive range of motion without pain and phonation normal. Neck supple.   Cardiovascular: Normal rate, regular rhythm, normal heart sounds, intact distal pulses and normal pulses.    Pulmonary/Chest: Effort normal and breath sounds normal. No respiratory distress.   Abdominal: Soft. Normal appearance and bowel sounds are normal. She exhibits no distension. There is no tenderness.   Musculoskeletal: Normal range of motion. She exhibits no edema or deformity.   Neurological: She is alert and oriented to person, place, and time. She exhibits normal muscle tone. Coordination normal.   Skin: Skin is warm, dry and intact. She is not diaphoretic. No pallor.   Psychiatric: She has a normal mood and affect. Her speech is normal and behavior is normal. Judgment and thought content normal. Cognition and memory are normal.   Nursing note and vitals reviewed.      Assessment:       1. Sinusitis, unspecified chronicity, unspecified location    2. Herpes virus infection of oral mucosa        Plan:       Zaira was seen today for sinus problem and mouth lesions.    Diagnoses and all orders for this visit:    Sinusitis, unspecified chronicity, unspecified location  -     amoxicillin-clavulanate 875-125mg (AUGMENTIN) 875-125 mg per tablet; Take 1 tablet by mouth 2 (two) times daily.    Herpes virus infection of oral mucosa  -     valacyclovir (VALTREX) 1000 MG tablet; Take 1 tablet (1,000 mg total) by mouth 3 (three) times daily.  -     acyclovir 5% (ZOVIRAX) 5 % ointment; Apply thin layer to affected area          Sinusitis (Antibiotic Treatment)    The sinuses are air-filled spaces within the bones of the face. They connect to the inside of the nose. Sinusitis is an  inflammation of the tissue lining the sinus cavity. Sinus inflammation can occur during a cold. It can also be due to allergies to pollens and other particles in the air. Sinusitis can cause symptoms of sinus congestion and fullness. A sinus infection causes fever, headache and facial pain. There is often green or yellow drainage from the nose or into the back of the throat (post-nasal drip). You have been given antibiotics to treat this condition.  Home care:  · Take the full course of antibiotics as instructed. Do not stop taking them, even if you feel better.  · Drink plenty of water, hot tea, and other liquids. This may help thin mucus. It also may promote sinus drainage.  · Heat may help soothe painful areas of the face. Use a towel soaked in hot water. Or,  the shower and direct the hot spray onto your face. Using a vaporizer along with a menthol rub at night may also help.   · An expectorant containing guaifenesin may help thin the mucus and promote drainage from the sinuses.  · Over-the-counter decongestants may be used unless a similar medicine was prescribed. Nasal sprays work the fastest. Use one that contains phenylephrine or oxymetazoline. First blow the nose gently. Then use the spray. Do not use these medicines more often than directed on the label or symptoms may get worse. You may also use tablets containing pseudoephedrine. Avoid products that combine ingredients, because side effects may be increased. Read labels. You can also ask the pharmacist for help. (NOTE: Persons with high blood pressure should not use decongestants. They can raise blood pressure.)  · Over-the-counter antihistamines may help if allergies contributed to your sinusitis.    · Do not use nasal rinses or irrigation during an acute sinus infection, unless told to by your health care provider. Rinsing may spread the infection to other sinuses.  · Use acetaminophen or ibuprofen to control pain, unless another pain medicine  was prescribed. (If you have chronic liver or kidney disease or ever had a stomach ulcer, talk with your doctor before using these medicines. Aspirin should never be used in anyone under 18 years of age who is ill with a fever. It may cause severe liver damage.)  · Don't smoke. This can worsen symptoms.  Follow-up care  Follow up with your healthcare provider or our staff if you are not improving within the next week.  When to seek medical advice  Call your healthcare provider if any of these occur:  · Facial pain or headache becoming more severe  · Stiff neck  · Unusual drowsiness or confusion  · Swelling of the forehead or eyelids  · Vision problems, including blurred or double vision  · Fever of 100.4ºF (38ºC) or higher, or as directed by your healthcare provider  · Seizure  · Breathing problems  · Symptoms not resolving within 10 days  Date Last Reviewed: 4/13/2015  © 5393-4984 [employer]. [address] [place] [address]. All rights reserved. This information is not intended as a substitute for professional medical care. Always follow your healthcare professional's instructions.        Understanding Cold Sores  Cold sores are small blisters or sores on the lip or sometimes inside the mouth. Many people get them from time to time. Cold sores usually are not serious, and they usually heal in a week or two. They are caused by 2 related viruses, herpes simplex type 1 and 2. These viruses spread very easily. Many people have one or both of these viruses in their body. More than 4 in every 5 people are infected with herpes simplex type 1. Once you have the virus that causes cold sores, it stays in your body for the rest of your life. But it can be inactive for long periods.  What causes a cold sore?  Cold sores are usually caused by herpes simplex virus type 1. Less often, they are caused by herpes simplex virus type 2. Herpes simplex virus type 2 is the more common cause of genital sores. The  herpes viruses can enter the body through a break in the skin such as a scrape. Or they may enter through mucous membranes such as the lips or mouth. Some ways to get the viruses include:  · Kissing someone who has a cold sore  · Sharing a drinking glass, eating utensils, or lip balm with someone who has a cold sore  · Having oral sex with someone who has a cold sore  A  baby can also get the infection at birth.  If you have a herpes virus, you can to pass it along even when you dont have a sore.  Cold sores flare up occasionally. Things that can cause an outbreak include:  · Sun exposure  · Fever  · Stress or exhaustion  · Menstruation  · Skin irritation  · Another unrelated Illness such as pneumonia, urinary infection, or cancer  What are the symptoms of a cold sore?  Symptoms can include:  · A blister-like sore or cluster of sores. These often occur at the edge of the lips but may appear inside the mouth.  · Skin redness around the sores.  · Pain or itching in the area of the outbreak. Often the pain or itching develops 12 to 24 hours before the sore become visible.  · Flu-like symptoms, including swollen glands, headache, body ache, or fever. These typically occur only at the time of the first infection.  Cold sores may also occur on fingers. They may rarely infect the eyes, a serious possible complication.  Some people have symptoms a day or two before an outbreak. They may feel tenderness, burning, itching, or tingling before a cold sore appears. Cold sores tend to come back in the same area that they first appeared.  How are cold sores treated?  Treatment for cold sores focuses on relieving and shortening symptoms. For people with frequent outbreaks, treatment works to decrease how often future episodes.  Treatments may include:  · Prescription or over-the-counter pain medicines. These can help with discomfort, especially if sores are inside the mouth.  · Antiviral medicines. These may be pills that  are taken by mouth or a cream to apply to sores. They may help shorten an outbreak and reduce the severity of symptoms.  They may be used to help prevent future outbreaks if you have disabling recurrent infections.  · Self-care such as extra rest and drinking more fluids. These may help relieve the flu-like symptoms of a first outbreak.  How are cold sores diagnosed?  Your healthcare provider makes the diagnosis mainly by looking at the sores and doing a clinical exam.  This may be confirmed by swab tests or blood tests.  How can I prevent cold sores?  You can help reduce the spread of the herpes viruses that cause cold sores. This can help both you and others avoid getting cold sores. Follow these tips:  · Do not kiss others if you have a cold sore. Also avoid kissing someone with a cold sore.  · Do not share eating utensils, lip balm, razors, or towels with someone who has a cold sore.  · Wash your hands after touching the area of a cold sore. The herpes virus can be carried from your face to your hands when you touch the area of a cold sore. When this happens, wash your hands thoroughly, for at least 20 seconds. When you cant wash with soap and water, use an alcohol-based hand .  · Disinfect things you touch often, such as phones and keyboards.    · If you feel a cold sore coming on, do the same things you would do when a cold sore is present to avoid spreading the virus.  · Use condoms to help prevent passing on the viruses through sex.  What are the possible complications of a cold sore?  Cold sores usually go away by themselves within 2 weeks. For most people cold sores are not serious. The viruses that cause cold sores can cause more serious illness, though. People who have a weak immune system may get more serious infections from herpes viruses. These include people being treated for cancer or who have HIV disease. Babies may also become very ill from a herpes infection.      When should I call my  healthcare provider?  Call your healthcare provider right away if you have any of these:  · Fever of 100.4°F (38°C) or higher, or as directed  · Pain that gets worse  · You cannot eat or drink because of painful sores  · Symptoms dont get better within 5 to 7 days  · Blisters spread beyond the mouth or lip to areas on the chest, arms, face, or legs   Date Last Reviewed: 3/28/2016  © 1152-5156 [employer]. [address] [place] [id number]. All rights reserved. This information is not intended as a substitute for professional medical care. Always follow your healthcare professional's instructions.      Please follow up with your Primary care provider within 2-5 days if your signs and symptoms have not resolved or worsen.     If your condition worsens or fails to improve we recommend that you receive another evaluation at the emergency room immediately or contact your primary medical clinic to discuss your concerns.   You must understand that you have received an Urgent Care treatment only and that you may be released before all of your medical problems are known or treated. You, the patient, will arrange for follow up care as instructed.

## 2017-12-31 ENCOUNTER — PATIENT MESSAGE (OUTPATIENT)
Dept: NEUROLOGY | Facility: CLINIC | Age: 22
End: 2017-12-31

## 2018-01-02 RX ORDER — SUMATRIPTAN SUCCINATE 100 MG/1
TABLET ORAL
Qty: 9 TABLET | Refills: 2 | Status: SHIPPED | OUTPATIENT
Start: 2018-01-02 | End: 2021-03-16 | Stop reason: SDUPTHER

## 2018-01-02 RX ORDER — SUMATRIPTAN SUCCINATE 100 MG/1
TABLET ORAL
Qty: 9 TABLET | Refills: 2 | OUTPATIENT
Start: 2018-01-02

## 2018-01-03 ENCOUNTER — OFFICE VISIT (OUTPATIENT)
Dept: URGENT CARE | Facility: CLINIC | Age: 23
End: 2018-01-03
Payer: COMMERCIAL

## 2018-01-03 VITALS
SYSTOLIC BLOOD PRESSURE: 133 MMHG | WEIGHT: 158 LBS | BODY MASS INDEX: 24.8 KG/M2 | DIASTOLIC BLOOD PRESSURE: 99 MMHG | TEMPERATURE: 98 F | HEART RATE: 110 BPM | OXYGEN SATURATION: 99 % | RESPIRATION RATE: 16 BRPM | HEIGHT: 67 IN

## 2018-01-03 DIAGNOSIS — R51.9 INTRACTABLE EPISODIC HEADACHE, UNSPECIFIED HEADACHE TYPE: Primary | ICD-10-CM

## 2018-01-03 DIAGNOSIS — J32.1 SINUSITIS CHRONIC, FRONTAL: ICD-10-CM

## 2018-01-03 DIAGNOSIS — J01.10 ACUTE NON-RECURRENT FRONTAL SINUSITIS: ICD-10-CM

## 2018-01-03 PROCEDURE — 99214 OFFICE O/P EST MOD 30 MIN: CPT | Mod: 25,S$GLB,, | Performed by: NURSE PRACTITIONER

## 2018-01-03 PROCEDURE — 96372 THER/PROPH/DIAG INJ SC/IM: CPT | Mod: S$GLB,,, | Performed by: EMERGENCY MEDICINE

## 2018-01-03 RX ORDER — CEFTRIAXONE 1 G/1
1 INJECTION, POWDER, FOR SOLUTION INTRAMUSCULAR; INTRAVENOUS
Status: COMPLETED | OUTPATIENT
Start: 2018-01-03 | End: 2018-01-03

## 2018-01-03 RX ORDER — LEVOTHYROXINE SODIUM 25 UG/1
25 TABLET ORAL DAILY
Refills: 2 | COMMUNITY
Start: 2017-11-09 | End: 2021-03-22

## 2018-01-03 RX ORDER — PROMETHAZINE HYDROCHLORIDE 25 MG/ML
25 INJECTION, SOLUTION INTRAMUSCULAR; INTRAVENOUS
Status: COMPLETED | OUTPATIENT
Start: 2018-01-03 | End: 2018-01-03

## 2018-01-03 RX ORDER — AMOXICILLIN AND CLAVULANATE POTASSIUM 875; 125 MG/1; MG/1
1 TABLET, FILM COATED ORAL 2 TIMES DAILY
Qty: 20 TABLET | Refills: 0 | Status: SHIPPED | OUTPATIENT
Start: 2018-01-03 | End: 2018-01-13

## 2018-01-03 RX ORDER — KETOROLAC TROMETHAMINE 30 MG/ML
30 INJECTION, SOLUTION INTRAMUSCULAR; INTRAVENOUS
Status: COMPLETED | OUTPATIENT
Start: 2018-01-03 | End: 2018-01-03

## 2018-01-03 RX ORDER — LISDEXAMFETAMINE DIMESYLATE 70 MG/1
CAPSULE ORAL EVERY MORNING
Refills: 0 | COMMUNITY
Start: 2017-11-29 | End: 2021-03-22

## 2018-01-03 RX ORDER — FLUTICASONE PROPIONATE 50 MCG
2 SPRAY, SUSPENSION (ML) NASAL DAILY
Qty: 1 BOTTLE | Refills: 0 | Status: SHIPPED | OUTPATIENT
Start: 2018-01-03 | End: 2019-01-03

## 2018-01-03 RX ADMIN — KETOROLAC TROMETHAMINE 30 MG: 30 INJECTION, SOLUTION INTRAMUSCULAR; INTRAVENOUS at 06:01

## 2018-01-03 RX ADMIN — CEFTRIAXONE 1 G: 1 INJECTION, POWDER, FOR SOLUTION INTRAMUSCULAR; INTRAVENOUS at 06:01

## 2018-01-03 RX ADMIN — PROMETHAZINE HYDROCHLORIDE 25 MG: 25 INJECTION, SOLUTION INTRAMUSCULAR; INTRAVENOUS at 06:01

## 2018-01-03 NOTE — PROGRESS NOTES
Subjective:       Patient ID: Zaira Reeder is a 22 y.o. female.    Vitals:    01/03/18 1751   BP: (!) 133/99   Pulse: 110   Resp: 16   Temp: 98 °F (36.7 °C)       Chief Complaint: Headache    Pt states migraine headache x 5 days. Pt states pain is the right side. Pt states her vision in her right eye is blurry. Pt has a hx of migraines and stroke.      Headache    This is a new problem. The current episode started in the past 7 days. The problem occurs constantly. The problem has been unchanged. The pain is located in the frontal region. The pain radiates to the right neck. The quality of the pain is described as sharp, stabbing and shooting. The pain is at a severity of 8/10. Associated symptoms include blurred vision, nausea, neck pain and phonophobia. Pertinent negatives include no dizziness, fever, numbness, photophobia, seizures, tinnitus, vomiting or weakness. Treatments tried: imitrex. The treatment provided no relief. Her past medical history is significant for migraine headaches.     Review of Systems   Constitution: Negative for chills, fever and weakness.   HENT: Positive for congestion. Negative for tinnitus.    Eyes: Positive for blurred vision. Negative for photophobia.   Skin: Negative for rash.   Musculoskeletal: Positive for neck pain.   Gastrointestinal: Positive for nausea. Negative for vomiting.   Neurological: Positive for headaches. Negative for disturbances in coordination, dizziness, numbness and seizures.   Psychiatric/Behavioral: Negative for altered mental status. The patient is not nervous/anxious.        Objective:      Physical Exam   Constitutional: She is oriented to person, place, and time. She appears well-developed and well-nourished. She is cooperative.  Non-toxic appearance. She does not appear ill. No distress.   HENT:   Head: Normocephalic and atraumatic.   Right Ear: Hearing, external ear and ear canal normal. Tympanic membrane is bulging.   Left Ear: Hearing, tympanic  membrane, external ear and ear canal normal.   Nose: Nose normal. No mucosal edema, rhinorrhea or nasal deformity. No epistaxis. Right sinus exhibits no maxillary sinus tenderness and no frontal sinus tenderness. Left sinus exhibits no maxillary sinus tenderness and no frontal sinus tenderness.   Mouth/Throat: Uvula is midline, oropharynx is clear and moist and mucous membranes are normal. No trismus in the jaw. Normal dentition. No uvula swelling. No posterior oropharyngeal erythema (cobble stoning noted to posterior phaynx).   Right sided temporal area is tender on palpation and slightly swollen.     Eyes: Conjunctivae, EOM and lids are normal. Pupils are equal, round, and reactive to light. No scleral icterus.   Sclera clear bilat   Neck: Trachea normal, normal range of motion, full passive range of motion without pain and phonation normal. Neck supple. No neck rigidity.   Cardiovascular: Normal rate, regular rhythm, normal heart sounds, intact distal pulses and normal pulses.    Pulmonary/Chest: Effort normal and breath sounds normal. No respiratory distress.   Abdominal: Soft. Normal appearance and bowel sounds are normal. She exhibits no distension. There is no tenderness.   Musculoskeletal: Normal range of motion. She exhibits no edema or deformity.   Lymphadenopathy:     She has cervical adenopathy.        Right cervical: Deep cervical adenopathy present.   Neurological: She is alert and oriented to person, place, and time. No cranial nerve deficit. She exhibits normal muscle tone. Coordination normal.   Skin: Skin is warm, dry and intact. She is not diaphoretic. No pallor.   Psychiatric: She has a normal mood and affect. Her speech is normal and behavior is normal. Judgment and thought content normal. Cognition and memory are normal.   Nursing note and vitals reviewed.      Assessment:       1. Intractable episodic headache, unspecified headache type    2. Sinusitis chronic, frontal    3. Acute  non-recurrent frontal sinusitis        Plan:       Zaira was seen today for headache.    Diagnoses and all orders for this visit:    Intractable episodic headache, unspecified headache type  -     promethazine injection 25 mg; Inject 1 mL (25 mg total) into the muscle one time.  -     ketorolac injection 30 mg; Inject 30 mg into the muscle one time.    Sinusitis chronic, frontal  -     cefTRIAXone injection 1 g; Inject 1 g into the muscle one time.    Acute non-recurrent frontal sinusitis  -     amoxicillin-clavulanate 875-125mg (AUGMENTIN) 875-125 mg per tablet; Take 1 tablet by mouth 2 (two) times daily.  -     fluticasone (FLONASE) 50 mcg/actuation nasal spray; 2 sprays by Each Nare route once daily.

## 2018-01-04 NOTE — PATIENT INSTRUCTIONS
Please return here or go to the Emergency Department for any concerns or worsening of condition.  If you were prescribed antibiotics, please take them to completion.  If you were prescribed a narcotic medication, do not drive or operate heavy equipment or machinery while taking these medications.  Please drink plenty of fluids.  Please get plenty of rest.  If not allergic, please take over the counter Tylenol (Acetaminophen) and/or Motrin (Ibuprofen) as directed for control of pain and/or fever.  Please follow up with your primary care doctor or specialist as needed.    If you  smoke, please stop smoking.  Please drink plenty of fluids.  Please get plenty of rest.  Please return here or go to the Emergency Department for any concerns or worsening of condition.  If you were given wait & see antibiotics, please wait 3-5 days before taking them, and only take them if your symptoms have worsened or not improved.  If you do begin taking the antibiotics, please take them to completion.  If you were prescribed antibiotics, please take them to completion.  If you were prescribed a narcotic medication, do not drive or operate heavy equipment or machinery while taking these medications.  If you do not have Hypertension or any history of palpitations, it is ok to take over the counter Sudafed or Mucinex D or Allegra-D or Claritin-D or Zyrtec-D.  If you do take one of the above, it is ok to combine that with plain over the counter Mucinex or Allegra or Claritin or Zyrtec.  If for example you are taking Zyrtec -D, you can combine that with Mucinex, but not Mucinex-D.  If you are taking Mucinex-D, you can combine that with plain Allegra or Claritin or Zyrtec.   If you do have Hypertension or palpitations, it is safe to take Coricidin HBP for relief of sinus symptoms.  We recommend you take over the counter Flonase (Fluticasone) or another nasally inhaled steroid unless you are already taking one.  Nasal irrigation with a saline  spray or Netti Pot like device per their directions is also recommended.  If not allergic, please take over the counter Tylenol (Acetaminophen) and/or Motrin (Ibuprofen) as directed for control of pain and/or fever.  Please follow up with your primary care doctor or specialist as needed.    If you  smoke, please stop smoking.  Acute Bacterial Rhinosinusitis (ABRS)    Acute bacterial rhinosinusitis (ABRS) is an infection of your nasal cavity and sinuses. Its caused by bacteria. Acute means that youve had symptoms for less than 12 weeks.  Understanding your sinuses  The nasal cavity is the large air-filled space behind your nose. The sinuses are a group of spaces formed by the bones of your face. They connect with your nasal cavity. ABRS causes the tissue lining these spaces to become inflamed. Mucus may not drain normally. This leads to facial pain and other symptoms.  What causes ABRS?  ABRS most often follows an upper respiratory infection caused by a virus. Bacteria then infect the lining of your nasal cavity and sinuses. But you can also get ABRS if you have:  · Nasal allergies  · Long-term nasal swelling and congestion not caused by allergies  · Blockage in the nose  Symptoms of ABRS  The symptoms of ABRS may be different for each person, and can include:  · Nasal congestion  · Runny nose  · Fluid draining from the nose down the throat (postnasal drip)  · Headache  · Cough  · Pain in the sinuses  · Thick, colored fluid from the nose (mucus)  · Fever  Diagnosing ABRS  ABRS may be diagnosed if youve had an upper respiratory infection like a cold and cough for longer than 10 to 14 days. Your health care provider will ask about your symptoms and your medical history. The provider will check your vital signs, including your temperature. Youll have a physical exam. The health care provider will check your ears, nose, and throat. You likely wont need any tests. If ABRS comes back, you may have a culture or other  tests.  Treatment for ABRS  Treatment may include:  · Antibiotic medicine. This is for symptoms that last for at least 10 to 14 days.  · Nasal corticosteroid medicine. Drops or spray used in the nose can lessen swelling and congestion.  · Over-the-counter pain medicine. This is to lessen sinus pain and pressure.  · Nasal decongestant medicine. Spray or drops may help to lessen congestion. Do not use them for more than a few days.  · Salt wash (saline irrigation). This can help to loosen mucus.  Possible complications of ABRS  ABRS may come back or become long-term (chronic).  In rare cases, ABRS may cause complications such as:   · Inflamed tissue around the brain and spinal cord (meningitis)  · Inflamed tissue around the eyes (orbital cellulitis)  · Inflamed bones around the sinuses (osteitis)  These problems may need to be treated in a hospital with intravenous (IV) antibiotic medicine or surgery.  When to call the health care provider  Call your health care provider if you have any of the following:  · Symptoms that dont get better, or get worse  · Symptoms that dont get better after 3 to 5 days on antibiotics  · Trouble seeing  · Swelling around your eyes  · Confusion or trouble staying awake   Date Last Reviewed: 3/3/2015  © 4174-0290 [employer]. [address] [place] [address], [place] [address]. All rights reserved. This information is not intended as a substitute for professional medical care. Always follow your healthcare professional's instructions.        Migraine Headache  This often severe type of headache is different from other types of headaches in that symptoms other than pain occur with the headache. Nausea and vomiting, lightheadedness, sensitivity to light (photophobia), and other visual disturbances are common migraine symptoms. The pain may last from a few hours to several days. It is not clear why migraines occur but certain factors called triggers can raise the risk of having a  migraine attack. A migraine may be triggered by emotional stress or depression, or by hormone changes during the menstrual cycle. Other triggers include birth control pills, overuse of migraine medicines, alcohol or caffeine, foods with tyramine (such as aged cheese and wine), eyestrain, weather changes, missed meals, or too little or too much sleep.  Home care  Follow these tips when taking care of yourself at home:  · Dont drive yourself home if you were given pain medicine for your headache or are having visual symptoms. Instead, have someone else drive you home. Try to sleep when you get home. You should feel much better when you wake up.  · Cold can help ease migraine symptoms. Put an ice pack on your forehead or at the base of your skull. Put heat on the back of your neck to help ease any neck spasm.  · Drink only clear liquids or eat a light diet until your symptoms get better. This will help you avoid nausea and vomiting.  How to prevent migraines  Pay attention to what seems to trigger your headache. Try to avoid the triggers when you can. If you have frequent headaches, consider keeping a headache diary. In it, write down what you were doing, feeling, or eating in the hours before each headache. Show this to your healthcare provider to help find the cause of your headaches.  If stress seems to be a trigger for your headaches, figure out what is causing stress in your life. Learn new ways to handle your stress. Ideas include regular exercise, biofeedback, self-hypnosis, yoga, and meditation. Talk with your healthcare provider to find out more information about managing stress. Many books and digital media are also available on this subject.  Tyramine is a substance found in many foods. It can trigger a migraine in some people. These foods contain tyramine:  · Chocolate  · Yogurt  · All cheeses, but especially aged cheeses  · Smoked or pickled fish and meat, including herring, caviar, bologna, pepperoni,  and salami  · Liver  · Avocados  · Bananas  · Figs  · Raisins  · Red wine  Try staying away from these foods for 1 to 2 months to see if you have fewer headaches.  How to treat future headaches  · Take time out at the first sign of a headache, if possible. Find a quiet, dark, comfortable place to sit or lie down. Let yourself relax or sleep.  · Put an ice pack on your forehead or on the area of greatest pain. A heating pad and massage may help if you are having a muscle spasm and tightness in your neck.  · If you have been prescribed a medicine to stop a migraine headache, use this at the first warning sign of the headache for best results. First signs may be an aura or pain.  · If you need to take medicine often for your migraine, talk with your healthcare provider about other ways to prevent your headaches.  Follow-up care  Follow up with your healthcare provider, or as advised. Talk with your provider if you have frequent headaches. He or she can figure out a treatment plan. Ask if you can have medicine to take at home the next time you get a bad headache. This may keep you from having to visit the emergency department in the future. You may need to see a headache specialist (neurologist) if you continue to have headaches.  When to seek medical advice  Call your healthcare provider right away if any of these occur:  · Your head pain gets worse, or doesnt get better within 24 hours  · You cant keep liquids down (repeated vomiting)  · Pain in your sinuses, ears, or throat  · Fever of 100.4º F (38º C) or higher, or as directed by your healthcare provider  · Stiff neck  · Extreme drowsiness, confusion, or fainting  · Dizziness, or dizziness with spinning sensation (vertigo)  · Weakness in an arm or leg, or on one side of your face  · Difficulty talking or seeing  Date Last Reviewed: 8/1/2016 © 2000-2017 [employer]. [address] [place] [address], [place] [address]. All rights reserved. This information is  not intended as a substitute for professional medical care. Always follow your healthcare professional's instructions.        Migraine Headache  This often severe type of headache is different from other types of headaches in that symptoms other than pain occur with the headache. Nausea and vomiting, lightheadedness, sensitivity to light (photophobia), and other visual disturbances are common migraine symptoms. The pain may last from a few hours to several days. It is not clear why migraines occur but certain factors called triggers can raise the risk of having a migraine attack. A migraine may be triggered by emotional stress or depression, or by hormone changes during the menstrual cycle. Other triggers include birth control pills, overuse of migraine medicines, alcohol or caffeine, foods with tyramine (such as aged cheese and wine), eyestrain, weather changes, missed meals, or too little or too much sleep.  Home care  Follow these tips when taking care of yourself at home:  · Dont drive yourself home if you were given pain medicine for your headache or are having visual symptoms. Instead, have someone else drive you home. Try to sleep when you get home. You should feel much better when you wake up.  · Cold can help ease migraine symptoms. Put an ice pack on your forehead or at the base of your skull. Put heat on the back of your neck to help ease any neck spasm.  · Drink only clear liquids or eat a light diet until your symptoms get better. This will help you avoid nausea and vomiting.  How to prevent migraines  Pay attention to what seems to trigger your headache. Try to avoid the triggers when you can. If you have frequent headaches, consider keeping a headache diary. In it, write down what you were doing, feeling, or eating in the hours before each headache. Show this to your healthcare provider to help find the cause of your headaches.  If stress seems to be a trigger for your headaches, figure out what is  causing stress in your life. Learn new ways to handle your stress. Ideas include regular exercise, biofeedback, self-hypnosis, yoga, and meditation. Talk with your healthcare provider to find out more information about managing stress. Many books and digital media are also available on this subject.  Tyramine is a substance found in many foods. It can trigger a migraine in some people. These foods contain tyramine:  · Chocolate  · Yogurt  · All cheeses, but especially aged cheeses  · Smoked or pickled fish and meat, including herring, caviar, bologna, pepperoni, and salami  · Liver  · Avocados  · Bananas  · Figs  · Raisins  · Red wine  Try staying away from these foods for 1 to 2 months to see if you have fewer headaches.  How to treat future headaches  · Take time out at the first sign of a headache, if possible. Find a quiet, dark, comfortable place to sit or lie down. Let yourself relax or sleep.  · Put an ice pack on your forehead or on the area of greatest pain. A heating pad and massage may help if you are having a muscle spasm and tightness in your neck.  · If you have been prescribed a medicine to stop a migraine headache, use this at the first warning sign of the headache for best results. First signs may be an aura or pain.  · If you need to take medicine often for your migraine, talk with your healthcare provider about other ways to prevent your headaches.  Follow-up care  Follow up with your healthcare provider, or as advised. Talk with your provider if you have frequent headaches. He or she can figure out a treatment plan. Ask if you can have medicine to take at home the next time you get a bad headache. This may keep you from having to visit the emergency department in the future. You may need to see a headache specialist (neurologist) if you continue to have headaches.  When to seek medical advice  Call your healthcare provider right away if any of these occur:  · Your head pain gets worse, or doesnt  get better within 24 hours  · You cant keep liquids down (repeated vomiting)  · Pain in your sinuses, ears, or throat  · Fever of 100.4º F (38º C) or higher, or as directed by your healthcare provider  · Stiff neck  · Extreme drowsiness, confusion, or fainting  · Dizziness, or dizziness with spinning sensation (vertigo)  · Weakness in an arm or leg, or on one side of your face  · Difficulty talking or seeing  Date Last Reviewed: 8/1/2016 © 2000-2017 [employer] [address] [place] [address], [place] [address]. All rights reserved. This information is not intended as a substitute for professional medical care. Always follow your healthcare professional's instructions.

## 2018-01-26 ENCOUNTER — PATIENT MESSAGE (OUTPATIENT)
Dept: NEUROLOGY | Facility: CLINIC | Age: 23
End: 2018-01-26

## 2018-01-30 RX ORDER — ZOLMITRIPTAN 5 MG/1
SPRAY NASAL
Qty: 12 EACH | Refills: 1 | Status: SHIPPED | OUTPATIENT
Start: 2018-01-30 | End: 2018-02-12 | Stop reason: SDUPTHER

## 2018-02-08 ENCOUNTER — PATIENT MESSAGE (OUTPATIENT)
Dept: NEUROLOGY | Facility: CLINIC | Age: 23
End: 2018-02-08

## 2018-02-12 RX ORDER — ZOLMITRIPTAN 5 MG/1
SPRAY NASAL
Qty: 12 EACH | Refills: 1 | Status: SHIPPED | OUTPATIENT
Start: 2018-02-12 | End: 2021-03-16

## 2018-02-18 ENCOUNTER — OFFICE VISIT (OUTPATIENT)
Dept: URGENT CARE | Facility: CLINIC | Age: 23
End: 2018-02-18
Payer: COMMERCIAL

## 2018-02-18 VITALS
TEMPERATURE: 97 F | HEIGHT: 67 IN | WEIGHT: 158 LBS | RESPIRATION RATE: 16 BRPM | OXYGEN SATURATION: 99 % | DIASTOLIC BLOOD PRESSURE: 89 MMHG | SYSTOLIC BLOOD PRESSURE: 126 MMHG | HEART RATE: 123 BPM | BODY MASS INDEX: 24.8 KG/M2

## 2018-02-18 DIAGNOSIS — J02.9 ACUTE PHARYNGITIS, UNSPECIFIED ETIOLOGY: Primary | ICD-10-CM

## 2018-02-18 LAB
CTP QC/QA: YES
S PYO RRNA THROAT QL PROBE: NEGATIVE

## 2018-02-18 PROCEDURE — 96372 THER/PROPH/DIAG INJ SC/IM: CPT | Mod: S$GLB,,, | Performed by: INTERNAL MEDICINE

## 2018-02-18 PROCEDURE — 87880 STREP A ASSAY W/OPTIC: CPT | Mod: QW,S$GLB,, | Performed by: NURSE PRACTITIONER

## 2018-02-18 PROCEDURE — 99214 OFFICE O/P EST MOD 30 MIN: CPT | Mod: 25,S$GLB,, | Performed by: NURSE PRACTITIONER

## 2018-02-18 PROCEDURE — 3008F BODY MASS INDEX DOCD: CPT | Mod: S$GLB,,, | Performed by: NURSE PRACTITIONER

## 2018-02-18 RX ORDER — BETAMETHASONE SODIUM PHOSPHATE AND BETAMETHASONE ACETATE 3; 3 MG/ML; MG/ML
6 INJECTION, SUSPENSION INTRA-ARTICULAR; INTRALESIONAL; INTRAMUSCULAR; SOFT TISSUE
Status: COMPLETED | OUTPATIENT
Start: 2018-02-18 | End: 2018-02-18

## 2018-02-18 RX ADMIN — BETAMETHASONE SODIUM PHOSPHATE AND BETAMETHASONE ACETATE 6 MG: 3; 3 INJECTION, SUSPENSION INTRA-ARTICULAR; INTRALESIONAL; INTRAMUSCULAR; SOFT TISSUE at 06:02

## 2018-02-19 NOTE — PROGRESS NOTES
"Subjective:       Patient ID: Zaira Reeder is a 22 y.o. female.    Vitals:  height is 5' 7" (1.702 m) and weight is 71.7 kg (158 lb). Her temperature is 97.4 °F (36.3 °C). Her blood pressure is 126/89 and her pulse is 123 (abnormal). Her respiration is 16 and oxygen saturation is 99%.     Chief Complaint: Sore Throat (pt's symptoms started this morning)    Sore Throat    This is a new problem. The current episode started today. The problem has been unchanged. The pain is worse on the right side. There has been no fever. The pain is mild. Associated symptoms include headaches. Pertinent negatives include no abdominal pain, congestion, coughing, ear pain, hoarse voice or shortness of breath. She has tried nothing for the symptoms. The treatment provided no relief.     Review of Systems   Constitution: Negative for chills, fever and malaise/fatigue.   HENT: Positive for sore throat. Negative for congestion, ear pain and hoarse voice.    Eyes: Negative for discharge and redness.   Cardiovascular: Negative for chest pain, dyspnea on exertion and leg swelling.   Respiratory: Negative for cough, shortness of breath, sputum production and wheezing.    Musculoskeletal: Negative for myalgias.   Gastrointestinal: Negative for abdominal pain and nausea.   Neurological: Positive for headaches.       Objective:      Physical Exam   Constitutional: She is oriented to person, place, and time. She appears well-developed and well-nourished. She is cooperative.  Non-toxic appearance. She does not appear ill. No distress.   HENT:   Head: Normocephalic and atraumatic.   Right Ear: Hearing, tympanic membrane, external ear and ear canal normal.   Left Ear: Hearing, tympanic membrane, external ear and ear canal normal.   Nose: Nose normal. No mucosal edema, rhinorrhea or nasal deformity. No epistaxis. Right sinus exhibits no maxillary sinus tenderness and no frontal sinus tenderness. Left sinus exhibits no maxillary sinus tenderness " and no frontal sinus tenderness.   Mouth/Throat: Uvula is midline and mucous membranes are normal. No trismus in the jaw. Normal dentition. No uvula swelling. Posterior oropharyngeal erythema (mild) present. No oropharyngeal exudate or posterior oropharyngeal edema. Tonsils are 0 on the right. Tonsils are 0 on the left.   Eyes: Conjunctivae and lids are normal. Pupils are equal, round, and reactive to light. No scleral icterus.   Sclera clear bilat   Neck: Trachea normal, normal range of motion, full passive range of motion without pain and phonation normal. Neck supple.   Cardiovascular: Normal rate, regular rhythm, normal heart sounds, intact distal pulses and normal pulses.    Pulmonary/Chest: Effort normal and breath sounds normal. No respiratory distress.   Abdominal: Soft. Normal appearance and bowel sounds are normal. She exhibits no distension. There is no tenderness.   Musculoskeletal: Normal range of motion. She exhibits no edema or deformity.   Neurological: She is alert and oriented to person, place, and time. She exhibits normal muscle tone. Coordination normal.   Skin: Skin is warm, dry and intact. She is not diaphoretic. No pallor.   Psychiatric: She has a normal mood and affect. Her speech is normal and behavior is normal. Judgment and thought content normal. Cognition and memory are normal.   Nursing note and vitals reviewed.          Results for orders placed or performed in visit on 02/18/18   POCT rapid strep A   Result Value Ref Range    Rapid Strep A Screen Negative Negative     Acceptable Yes        Assessment:       1. Acute pharyngitis, unspecified etiology        Plan:         Acute pharyngitis, unspecified etiology  -     POCT rapid strep A  -     betamethasone acetate-betamethasone sodium phosphate injection 6 mg; Inject 1 mL (6 mg total) into the muscle one time.  -     (Magic mouthwash) 1:1:1 Benadryl 12.5mg/5ml liq, aluminum & magnesium hydroxide-simehticone (Maalox),  lidocaine viscous 2%; Swish and spit 5 mLs every 4 (four) hours as needed.  Dispense: 120 mL; Refill: 0  -     Aerobic culture      Patient Instructions      Follow up with your doctor in a few days as needed.  Return to the urgent care or go to the ER if symptoms get worse.    If you've had labs sent out, it will generally take 4-7 days for results to return. We will call you with the results.      Pharyngitis (Sore Throat), Report Pending    Pharyngitis (sore throat) is often due to a virus. It can also be caused by the streptococcus, or strep, bacterium, often called strep throat. Both viral and strep infections can cause throat pain that is worse when swallowing, aching all over with headache, and fever. Both types of infections are contagious. They may be spread by coughing, kissing, or touching others after touching your mouth or nose.  A test has been done to find out whether you (or your child, if your child is the patient) have strep throat. Call this facility or your healthcare provider if you were not given your test results. If the test is positive for strep infection, you will need to take antibiotic medicines. A prescription can be called into your pharmacy at that time. If the test is negative, you probably have a viral pharyngitis. This does not need to be treated with antibiotics. Until you receive the results of the strep test, you should stay home from work. If your child is being tested, he or she should stay home from school.  Home care  · Rest at home. Drink plenty of fluids so you won't get dehydrated.  · If the test is positive for strep, don't go to work or school for the first 2 days of taking the antibiotics. After this time, you will not be contagious. You can then return to work or school if you are feeling better.   · Take the antibiotic medicine for the full 10 days, even if you feel better. This is very important to make sure the infection is treated. It is also important to prevent  drug-resistant germs from developing. If you were given an antibiotic shot, you won't need more antibiotics.  · For children: Use acetaminophen for fever, fussiness, or discomfort. In infants older than 6 months of age, you may use ibuprofen instead of acetaminophen. Talk with your child's healthcare provider before giving these medicines if your child has chronic liver or kidney disease or ever had a stomach ulcer or GI bleeding. Never give aspirin to a child under 18 years of age who is ill with a fever. It may cause severe liver damage.  · For adults: Use acetaminophen or ibuprofen to control pain or fever, unless another medicine was prescribed for this. Talk with your healthcare provider before taking these medicines if you have chronic liver or kidney disease or ever had a stomach ulcer or GI bleeding.  · Use throat lozenges or numbing throat sprays to help reduce pain. Gargling with warm salt water will also help reduce throat pain. For this, dissolve 1/2 teaspoon of salt in 1 glass of warm water. To help soothe a sore throat, children can sip on juice or a popsicle. Children 5 years and older can also suck on a lollipop or hard candy.  · Don't eat salty or spicy foods. These can irritate the throat.  Follow-up care  Follow up with your healthcare provider or our staff if you don't get better over the next week.  When to seek medical advice  Call your healthcare provider right away if any of these occur:  · Fever as directed by your healthcare provider. For children, seek care if:  ¨ Your child is of any age and has repeated fevers above 104°F (40°C).  ¨ Your child is younger than 2 years of age and has a fever of 100.4°F (38°C) that continues for more than 1 day.  ¨ Your child is 2 years old or older and has a fever of 100.4°F (38°C) that continues for more than 3 days.  · New or worsening ear pain, sinus pain, or headache  · Painful lumps in the back of neck  · Stiff neck  · Lymph nodes are getting  larger  · Inability to swallow liquids, excessive drooling, or inability to open mouth wide due to throat pain  · Signs of dehydration (very dark urine or no urine, sunken eyes, dizziness)  · Trouble breathing or noisy breathing  · Muffled voice  · New rash  · Child appears to be getting sicker  Date Last Reviewed: 4/13/2015  © 9490-7075 [employer] [address] [place] [address], [place] [address]. All rights reserved. This information is not intended as a substitute for professional medical care. Always follow your healthcare professional's instructions.

## 2018-02-19 NOTE — PATIENT INSTRUCTIONS
Follow up with your doctor in a few days as needed.  Return to the urgent care or go to the ER if symptoms get worse.    If you've had labs sent out, it will generally take 4-7 days for results to return. We will call you with the results.      Pharyngitis (Sore Throat), Report Pending    Pharyngitis (sore throat) is often due to a virus. It can also be caused by the streptococcus, or strep, bacterium, often called strep throat. Both viral and strep infections can cause throat pain that is worse when swallowing, aching all over with headache, and fever. Both types of infections are contagious. They may be spread by coughing, kissing, or touching others after touching your mouth or nose.  A test has been done to find out whether you (or your child, if your child is the patient) have strep throat. Call this facility or your healthcare provider if you were not given your test results. If the test is positive for strep infection, you will need to take antibiotic medicines. A prescription can be called into your pharmacy at that time. If the test is negative, you probably have a viral pharyngitis. This does not need to be treated with antibiotics. Until you receive the results of the strep test, you should stay home from work. If your child is being tested, he or she should stay home from school.  Home care  · Rest at home. Drink plenty of fluids so you won't get dehydrated.  · If the test is positive for strep, don't go to work or school for the first 2 days of taking the antibiotics. After this time, you will not be contagious. You can then return to work or school if you are feeling better.   · Take the antibiotic medicine for the full 10 days, even if you feel better. This is very important to make sure the infection is treated. It is also important to prevent drug-resistant germs from developing. If you were given an antibiotic shot, you won't need more antibiotics.  · For children: Use acetaminophen for fever,  fussiness, or discomfort. In infants older than 6 months of age, you may use ibuprofen instead of acetaminophen. Talk with your child's healthcare provider before giving these medicines if your child has chronic liver or kidney disease or ever had a stomach ulcer or GI bleeding. Never give aspirin to a child under 18 years of age who is ill with a fever. It may cause severe liver damage.  · For adults: Use acetaminophen or ibuprofen to control pain or fever, unless another medicine was prescribed for this. Talk with your healthcare provider before taking these medicines if you have chronic liver or kidney disease or ever had a stomach ulcer or GI bleeding.  · Use throat lozenges or numbing throat sprays to help reduce pain. Gargling with warm salt water will also help reduce throat pain. For this, dissolve 1/2 teaspoon of salt in 1 glass of warm water. To help soothe a sore throat, children can sip on juice or a popsicle. Children 5 years and older can also suck on a lollipop or hard candy.  · Don't eat salty or spicy foods. These can irritate the throat.  Follow-up care  Follow up with your healthcare provider or our staff if you don't get better over the next week.  When to seek medical advice  Call your healthcare provider right away if any of these occur:  · Fever as directed by your healthcare provider. For children, seek care if:  ¨ Your child is of any age and has repeated fevers above 104°F (40°C).  ¨ Your child is younger than 2 years of age and has a fever of 100.4°F (38°C) that continues for more than 1 day.  ¨ Your child is 2 years old or older and has a fever of 100.4°F (38°C) that continues for more than 3 days.  · New or worsening ear pain, sinus pain, or headache  · Painful lumps in the back of neck  · Stiff neck  · Lymph nodes are getting larger  · Inability to swallow liquids, excessive drooling, or inability to open mouth wide due to throat pain  · Signs of dehydration (very dark urine or no  urine, sunken eyes, dizziness)  · Trouble breathing or noisy breathing  · Muffled voice  · New rash  · Child appears to be getting sicker  Date Last Reviewed: 4/13/2015  © 4288-0389 [employer]. [address] [place] [address], [place] [address]. All rights reserved. This information is not intended as a substitute for professional medical care. Always follow your healthcare professional's instructions.

## 2018-02-21 LAB
BACTERIA SPEC AEROBE CULT: NORMAL
BACTERIA SPEC CULT: NORMAL

## 2018-03-05 ENCOUNTER — OFFICE VISIT (OUTPATIENT)
Dept: URGENT CARE | Facility: CLINIC | Age: 23
End: 2018-03-05
Payer: COMMERCIAL

## 2018-03-05 VITALS
BODY MASS INDEX: 24.8 KG/M2 | WEIGHT: 158 LBS | HEART RATE: 114 BPM | TEMPERATURE: 98 F | DIASTOLIC BLOOD PRESSURE: 89 MMHG | HEIGHT: 67 IN | RESPIRATION RATE: 16 BRPM | OXYGEN SATURATION: 98 % | SYSTOLIC BLOOD PRESSURE: 133 MMHG

## 2018-03-05 DIAGNOSIS — B34.9 VIRAL SYNDROME: Primary | ICD-10-CM

## 2018-03-05 PROCEDURE — 99214 OFFICE O/P EST MOD 30 MIN: CPT | Mod: 25,S$GLB,, | Performed by: FAMILY MEDICINE

## 2018-03-05 PROCEDURE — 96372 THER/PROPH/DIAG INJ SC/IM: CPT | Mod: S$GLB,,, | Performed by: EMERGENCY MEDICINE

## 2018-03-05 RX ORDER — ESZOPICLONE 2 MG/1
2 TABLET, FILM COATED ORAL NIGHTLY
COMMUNITY
End: 2021-07-22

## 2018-03-05 RX ORDER — AZITHROMYCIN 250 MG/1
TABLET, FILM COATED ORAL
Qty: 6 TABLET | Refills: 0 | Status: SHIPPED | OUTPATIENT
Start: 2018-03-05 | End: 2021-03-22

## 2018-03-05 RX ORDER — BETAMETHASONE SODIUM PHOSPHATE AND BETAMETHASONE ACETATE 3; 3 MG/ML; MG/ML
9 INJECTION, SUSPENSION INTRA-ARTICULAR; INTRALESIONAL; INTRAMUSCULAR; SOFT TISSUE
Status: COMPLETED | OUTPATIENT
Start: 2018-03-05 | End: 2018-03-05

## 2018-03-05 RX ADMIN — BETAMETHASONE SODIUM PHOSPHATE AND BETAMETHASONE ACETATE 9 MG: 3; 3 INJECTION, SUSPENSION INTRA-ARTICULAR; INTRALESIONAL; INTRAMUSCULAR; SOFT TISSUE at 12:03

## 2018-03-05 NOTE — PATIENT INSTRUCTIONS
"Your chest Xray was negative.     Please return if you develop new or worsening symptoms.       Viral Syndrome (Adult)  A viral illness may cause a number of symptoms. The symptoms depend on the part of the body that the virus affects. If it settles in your nose, throat, and lungs, it may cause cough, sore throat, congestion, and sometimes headache. If it settles in your stomach and intestinal tract, it may cause vomiting and diarrhea. Sometimes it causes vague symptoms like "aching all over," feeling tired, loss of appetite, or fever.  A viral illness usually lasts 1 to 2 weeks, but sometimes it lasts longer. In some cases, a more serious infection can look like a viral syndrome in the first few days of the illness. You may need another exam and additional tests to know the difference. Watch for the warning signs listed below.  Home care  Follow these guidelines for taking care of yourself at home:  · If symptoms are severe, rest at home for the first 2 to 3 days.  · Stay away from cigarette smoke - both your smoke and the smoke from others.  · You may use over-the-counter acetaminophen or ibuprofen for fever, muscle aching, and headache, unless another medicine was prescribed for this. If you have chronic liver or kidney disease or ever had a stomach ulcer or GI bleeding, talk with your doctor before using these medicines. No one who is younger than 18 and ill with a fever should take aspirin. It may cause severe disease or death.  · Your appetite may be poor, so a light diet is fine. Avoid dehydration by drinking 8 to 12 8-ounce glasses of fluids each day. This may include water; orange juice; lemonade; apple, grape, and cranberry juice; clear fruit drinks; electrolyte replacement and sports drinks; and decaffeinated teas and coffee. If you have been diagnosed with a kidney disease, ask your doctor how much and what types of fluids you should drink to prevent dehydration. If you have kidney disease, drinking too " much fluid can cause it build up in the your body and be dangerous to your health.  · Over-the-counter remedies won't shorten the length of the illness but may be helpful for cough, sore throat; and nasal and sinus congestion. Don't use decongestants if you have high blood pressure.  Follow-up care  Follow up with your healthcare provider if you do not improve over the next week.  Call 911  Get emergency medical care if any of the following occur:  · Convulsion  · Feeling weak, dizzy, or like you are going to faint  · Chest pain, shortness of breath, wheezing, or difficulty breathing  When to seek medical advice  Call your healthcare provider right away if any of these occur:  · Cough with lots of colored sputum (mucus) or blood in your sputum  · Chest pain, shortness of breath, wheezing, or difficulty breathing  · Severe headache; face, neck, or ear pain  · Severe, constant pain in the lower right side of your belly (abdominal)  · Continued vomiting (cant keep liquids down)  · Frequent diarrhea (more than 5 times a day); blood (red or black color) or mucus in diarrhea  · Feeling weak, dizzy, or like you are going to faint  · Extreme thirst  · Fever of 100.4°F (38°C) or higher, or as directed by your healthcare provider  Date Last Reviewed: 9/25/2015  © 8912-3803 [employer]. [address] [place] [address], [place] [address]. All rights reserved. This information is not intended as a substitute for professional medical care. Always follow your healthcare professional's instructions.

## 2018-03-05 NOTE — PROGRESS NOTES
Subjective:       Patient ID: Zaira Reeder is a 22 y.o. female.    Vitals:    03/05/18 1044   BP: 133/89   Pulse: (!) 114   Resp: 16   Temp: 98.3 °F (36.8 °C)   O2 98%    Chief Complaint: Influenza    Pt states she was seen on 2/18/18 at [facility] for sinus congestion. Pt states she was tested for the flu on 2/27/18 at [facility] and diagnosed with Flu B. Pt took Tamiflu. Pt states she continues to be very tired to the point of not being able to focus. Short of breath on Saturday night while walking up stairs.       Influenza   This is a new problem. The current episode started in the past 7 days. The problem occurs constantly. The problem has been unchanged. Associated symptoms include chills, congestion, coughing and headaches. Pertinent negatives include no abdominal pain, chest pain, fever, nausea, rash, sore throat or vomiting. Nothing aggravates the symptoms. She has tried nothing (tamiflu) for the symptoms.     Review of Systems   Constitution: Positive for chills and malaise/fatigue. Negative for fever.   HENT: Positive for congestion. Negative for sore throat.    Eyes: Negative for blurred vision.   Cardiovascular: Negative for chest pain.   Respiratory: Positive for cough. Negative for shortness of breath.    Skin: Negative for rash.   Musculoskeletal: Negative for back pain and joint pain.   Gastrointestinal: Negative for abdominal pain, diarrhea, nausea and vomiting.   Neurological: Positive for headaches.   Psychiatric/Behavioral: The patient is not nervous/anxious.        Objective:      Physical Exam   Constitutional: She is oriented to person, place, and time. She appears well-developed and well-nourished. She is cooperative.  Non-toxic appearance. She does not appear ill. No distress.   HENT:   Head: Normocephalic and atraumatic.   Right Ear: Hearing, tympanic membrane, external ear and ear canal normal.   Left Ear: Hearing, tympanic membrane, external ear and ear canal normal.   Nose:  Nose normal. No mucosal edema, rhinorrhea or nasal deformity. No epistaxis. Right sinus exhibits no maxillary sinus tenderness and no frontal sinus tenderness. Left sinus exhibits no maxillary sinus tenderness and no frontal sinus tenderness.   Mouth/Throat: Uvula is midline, oropharynx is clear and moist and mucous membranes are normal. No trismus in the jaw. Normal dentition. No uvula swelling. No posterior oropharyngeal erythema. No tonsillar exudate.   Eyes: Conjunctivae and lids are normal. No scleral icterus.   Sclera clear bilat   Neck: Trachea normal, full passive range of motion without pain and phonation normal. Neck supple.   Cardiovascular: Normal rate, regular rhythm, normal heart sounds, intact distal pulses and normal pulses.    Pulmonary/Chest: Effort normal and breath sounds normal. No tachypnea. No respiratory distress. She has no wheezes. She has no rhonchi.   Abdominal: Soft. Normal appearance and bowel sounds are normal. She exhibits no distension. There is no tenderness.   Musculoskeletal: Normal range of motion. She exhibits no edema or deformity.   Neurological: She is alert and oriented to person, place, and time. She exhibits normal muscle tone. Coordination normal.   Skin: Skin is warm, dry and intact. She is not diaphoretic. No pallor.   Psychiatric: She has a normal mood and affect. Her speech is normal and behavior is normal. Judgment and thought content normal. Cognition and memory are normal.   Nursing note and vitals reviewed.      Procedure:  Exam Date: Reason for Exam:   X-Ray Chest PA And Lateral 03/05/2018 None Specified             RESULTS:  2 views     Heart size is normal.  The lungs are clear.  No pleural effusion  IMPRESSION:    See above        Electronically signed by: Gatito Donald MD  Date:                                            03/05/18  Time:                                           11:58            Assessment:       1. Viral syndrome        Plan:       Zaira was  "seen today for influenza.    Diagnoses and all orders for this visit:    Viral syndrome  -     X-Ray Chest PA And Lateral; Future  -     betamethasone acetate-betamethasone sodium phosphate injection 9 mg; Inject 1.5 mLs (9 mg total) into the muscle one time.  -     azithromycin (Z-HEMA) 250 MG tablet; Please take two tablets (500mg total) by mouth on day 1; then one tablet (250mg) by mouth on days 2-5.          Patient Instructions   Your chest Xray was negative.     Please return if you develop new or worsening symptoms.       Viral Syndrome (Adult)  A viral illness may cause a number of symptoms. The symptoms depend on the part of the body that the virus affects. If it settles in your nose, throat, and lungs, it may cause cough, sore throat, congestion, and sometimes headache. If it settles in your stomach and intestinal tract, it may cause vomiting and diarrhea. Sometimes it causes vague symptoms like "aching all over," feeling tired, loss of appetite, or fever.  A viral illness usually lasts 1 to 2 weeks, but sometimes it lasts longer. In some cases, a more serious infection can look like a viral syndrome in the first few days of the illness. You may need another exam and additional tests to know the difference. Watch for the warning signs listed below.  Home care  Follow these guidelines for taking care of yourself at home:  · If symptoms are severe, rest at home for the first 2 to 3 days.  · Stay away from cigarette smoke - both your smoke and the smoke from others.  · You may use over-the-counter acetaminophen or ibuprofen for fever, muscle aching, and headache, unless another medicine was prescribed for this. If you have chronic liver or kidney disease or ever had a stomach ulcer or GI bleeding, talk with your doctor before using these medicines. No one who is younger than 18 and ill with a fever should take aspirin. It may cause severe disease or death.  · Your appetite may be poor, so a light diet is fine. " Avoid dehydration by drinking 8 to 12 8-ounce glasses of fluids each day. This may include water; orange juice; lemonade; apple, grape, and cranberry juice; clear fruit drinks; electrolyte replacement and sports drinks; and decaffeinated teas and coffee. If you have been diagnosed with a kidney disease, ask your doctor how much and what types of fluids you should drink to prevent dehydration. If you have kidney disease, drinking too much fluid can cause it build up in the your body and be dangerous to your health.  · Over-the-counter remedies won't shorten the length of the illness but may be helpful for cough, sore throat; and nasal and sinus congestion. Don't use decongestants if you have high blood pressure.  Follow-up care  Follow up with your healthcare provider if you do not improve over the next week.  Call 911  Get emergency medical care if any of the following occur:  · Convulsion  · Feeling weak, dizzy, or like you are going to faint  · Chest pain, shortness of breath, wheezing, or difficulty breathing  When to seek medical advice  Call your healthcare provider right away if any of these occur:  · Cough with lots of colored sputum (mucus) or blood in your sputum  · Chest pain, shortness of breath, wheezing, or difficulty breathing  · Severe headache; face, neck, or ear pain  · Severe, constant pain in the lower right side of your belly (abdominal)  · Continued vomiting (cant keep liquids down)  · Frequent diarrhea (more than 5 times a day); blood (red or black color) or mucus in diarrhea  · Feeling weak, dizzy, or like you are going to faint  · Extreme thirst  · Fever of 100.4°F (38°C) or higher, or as directed by your healthcare provider  Date Last Reviewed: 9/25/2015  © 2382-8667 [employer]. [address] [place] [address], [place] [address]. All rights reserved. This information is not intended as a substitute for professional medical care. Always follow your healthcare professional's  instructions.

## 2018-04-22 ENCOUNTER — OFFICE VISIT (OUTPATIENT)
Dept: URGENT CARE | Facility: CLINIC | Age: 23
End: 2018-04-22
Payer: COMMERCIAL

## 2018-04-22 VITALS
WEIGHT: 158 LBS | RESPIRATION RATE: 18 BRPM | BODY MASS INDEX: 24.8 KG/M2 | TEMPERATURE: 98 F | DIASTOLIC BLOOD PRESSURE: 100 MMHG | OXYGEN SATURATION: 98 % | HEART RATE: 113 BPM | SYSTOLIC BLOOD PRESSURE: 148 MMHG | HEIGHT: 67 IN

## 2018-04-22 DIAGNOSIS — G43.009 MIGRAINE WITHOUT AURA AND WITHOUT STATUS MIGRAINOSUS, NOT INTRACTABLE: ICD-10-CM

## 2018-04-22 DIAGNOSIS — J04.0 ACUTE LARYNGITIS: ICD-10-CM

## 2018-04-22 DIAGNOSIS — R51.9 SINUS HEADACHE: Primary | ICD-10-CM

## 2018-04-22 DIAGNOSIS — J06.9 URI, ACUTE: ICD-10-CM

## 2018-04-22 PROCEDURE — 99214 OFFICE O/P EST MOD 30 MIN: CPT | Mod: 25,S$GLB,, | Performed by: EMERGENCY MEDICINE

## 2018-04-22 PROCEDURE — 96372 THER/PROPH/DIAG INJ SC/IM: CPT | Mod: S$GLB,,, | Performed by: EMERGENCY MEDICINE

## 2018-04-22 RX ORDER — KETOROLAC TROMETHAMINE 30 MG/ML
30 INJECTION, SOLUTION INTRAMUSCULAR; INTRAVENOUS
Status: COMPLETED | OUTPATIENT
Start: 2018-04-22 | End: 2018-04-22

## 2018-04-22 RX ORDER — PROMETHAZINE HYDROCHLORIDE AND CODEINE PHOSPHATE 6.25; 1 MG/5ML; MG/5ML
SOLUTION ORAL
Qty: 118 ML | Refills: 0 | Status: SHIPPED | OUTPATIENT
Start: 2018-04-22 | End: 2021-03-16

## 2018-04-22 RX ORDER — AZITHROMYCIN 250 MG/1
TABLET, FILM COATED ORAL
Qty: 6 TABLET | Refills: 0 | Status: SHIPPED | OUTPATIENT
Start: 2018-04-22 | End: 2021-03-22

## 2018-04-22 RX ORDER — BETAMETHASONE SODIUM PHOSPHATE AND BETAMETHASONE ACETATE 3; 3 MG/ML; MG/ML
6 INJECTION, SUSPENSION INTRA-ARTICULAR; INTRALESIONAL; INTRAMUSCULAR; SOFT TISSUE
Status: COMPLETED | OUTPATIENT
Start: 2018-04-22 | End: 2018-04-22

## 2018-04-22 RX ORDER — OSELTAMIVIR PHOSPHATE 75 MG/1
CAPSULE ORAL
Refills: 0 | COMMUNITY
Start: 2018-02-20 | End: 2021-03-22

## 2018-04-22 RX ORDER — PROMETHAZINE HYDROCHLORIDE AND DEXTROMETHORPHAN HYDROBROMIDE 6.25; 15 MG/5ML; MG/5ML
SYRUP ORAL
Refills: 0 | COMMUNITY
Start: 2018-02-20 | End: 2021-03-22

## 2018-04-22 RX ADMIN — KETOROLAC TROMETHAMINE 30 MG: 30 INJECTION, SOLUTION INTRAMUSCULAR; INTRAVENOUS at 12:04

## 2018-04-22 RX ADMIN — BETAMETHASONE SODIUM PHOSPHATE AND BETAMETHASONE ACETATE 6 MG: 3; 3 INJECTION, SUSPENSION INTRA-ARTICULAR; INTRALESIONAL; INTRAMUSCULAR; SOFT TISSUE at 12:04

## 2018-04-22 NOTE — PATIENT INSTRUCTIONS
REST AND HYDRATE WITH PLENTY OF FLUIDS  TORADOL 30 MG GIVEN IN CLINIC  CELESTONE 1 CC GIVEN IN CLINIC  TAKE MOTRIN OR TYLENOL FOR PAIN/FEVER/INFLAMMATION  [facility] RX-ONLY FILL IF NOT MUCH BETTER IN A FEW DAYS  CONTINUE ADVIL COLD AND SINUS FOR CONGESTION/POST NASAL DRIP  ADD MUCINEX DM TWICE DAILY    SEE LARYNGITIS AND URI SHEET  FOLLOW UP WITH PCP NEXT WEEK IF NOT FULLY RESOLVED      Viral Upper Respiratory Illness (Adult)  You have a viral upper respiratory illness (URI), which is another term for the common cold. This illness is contagious during the first few days. It is spread through the air by coughing and sneezing. It may also be spread by direct contact (touching the sick person and then touching your own eyes, nose, or mouth). Frequent handwashing will decrease risk of spread. Most viral illnesses go away within 7 to 10 days with rest and simple home remedies. Sometimes the illness may last for several weeks. Antibiotics will not kill a virus, and they are generally not prescribed for this condition.    Home care  · If symptoms are severe, rest at home for the first 2 to 3 days. When you resume activity, don't let yourself get too tired.  · Avoid being exposed to cigarette smoke (yours or others).  · You may use acetaminophen or ibuprofen to control pain and fever, unless another medicine was prescribed. (Note: If you have chronic liver or kidney disease, have ever had a stomach ulcer or gastrointestinal bleeding, or are taking blood-thinning medicines, talk with your healthcare provider before using these medicines.) Aspirin should never be given to anyone under 18 years of age who is ill with a viral infection or fever. It may cause severe liver or brain damage.  · Your appetite may be poor, so a light diet is fine. Avoid dehydration by drinking 6 to 8 glasses of fluids per day (water, soft drinks, juices, tea, or soup). Extra fluids will help loosen secretions in the nose and lungs.  · Over-the-counter  cold medicines will not shorten the length of time youre sick, but they may be helpful for the following symptoms: cough, sore throat, and nasal and sinus congestion. (Note: Do not use decongestants if you have high blood pressure.)  Follow-up care  Follow up with your healthcare provider, or as advised.  When to seek medical advice  Call your healthcare provider right away if any of these occur:  · Cough with lots of colored sputum (mucus)  · Severe headache; face, neck, or ear pain  · Difficulty swallowing due to throat pain  · Fever of 100.4°F (38°C)  Call 911, or get immediate medical care  Call emergency services right away if any of these occur:  · Chest pain, shortness of breath, wheezing, or difficulty breathing  · Coughing up blood  · Inability to swallow due to throat pain  Date Last Reviewed: 9/13/2015  © 9726-2680 [employer]. [address] [place] [address]. All rights reserved. This information is not intended as a substitute for professional medical care. Always follow your healthcare professional's instructions.        Laryngitis    Laryngitis is a swelling of the tissues around the vocal cords. Symptoms include a hoarse (scratchy) voice. The voice may be lost completely. It may be caused by a viral illness, such as a head or chest cold. It may also be due to overuse and strain of the voice. Smoking, drinking alcohol, acid reflux, allergies, or inhaling harsh chemicals may also lead to symptoms. This condition will usually resolve in 1-2 weeks.  Home care  · Rest your voice until it recovers. Talk as little as possible. If your symptoms are severe, rest at home for a day or so.  · Breathing cool steam from a humidifier/vaporizer or in a steamy shower may be helpful.  · Drink plenty of fluids to stay well hydrated.  · Do not smoke  Follow-up care  Follow up with your healthcare provider or this facility if you have not improved after one week.  When to seek medical  advice  Contact your healthcare provider for any of the following:  · Severe pain with swallowing  · Trouble opening mouth  · Neck swelling, neck pain, or trouble moving neck  · Noisy breathing or trouble breathing  · Fever of 100.4°F (38.ºC) or higher, or as directed by your healthcare provider  · Drooling  · Symptoms do not resolve in 2 weeks  Date Last Reviewed: 4/26/2015  © 3989-2833 [employer]. [address] [place] [address], [place] [address]. All rights reserved. This information is not intended as a substitute for professional medical care. Always follow your healthcare professional's instructions.

## 2018-04-22 NOTE — PROGRESS NOTES
"Subjective:       Patient ID: Zaira Reeder is a 22 y.o. female.    Vitals:    04/22/18 1150   BP: (!) 148/100   Pulse: (!) 113   Resp: 18   Temp: 97.8 °F (36.6 °C)   SpO2: 98%   Weight: 71.7 kg (158 lb)   Height: 5' 7" (1.702 m)       Chief Complaint: Sinus Problem    Pt c/o nasal congestion, sore throat, hoarseness, feeling hot and a migraine that started in the last 24 hours.She reports sinus headache and basically a migraine headache that wont resolve with her normal imitrex. Reports sore throat, sinus pressure, and cough over the last 24-36 hours.      Sinus Problem   This is a new problem. The current episode started yesterday. The problem has been gradually worsening since onset. There has been no fever. The fever has been present for less than 1 day. Her pain is at a severity of 5/10. The pain is moderate. Associated symptoms include congestion, headaches, a hoarse voice, sinus pressure and a sore throat. Pertinent negatives include no chills, coughing, ear pain or shortness of breath. Treatments tried: advil cold and sinus\, zyrtec. The treatment provided no relief.     Review of Systems   Constitution: Positive for night sweats. Negative for chills, fever and malaise/fatigue.   HENT: Positive for congestion, hoarse voice, sinus pressure and sore throat. Negative for ear pain.    Eyes: Negative for discharge and redness.   Cardiovascular: Negative for chest pain, dyspnea on exertion and leg swelling.   Respiratory: Negative for cough, shortness of breath, sputum production and wheezing.    Musculoskeletal: Negative for myalgias.   Gastrointestinal: Negative for abdominal pain and nausea.   Neurological: Positive for headaches.       Objective:      Physical Exam   Constitutional: She is oriented to person, place, and time. She appears well-developed and well-nourished. She is cooperative.  Non-toxic appearance. She does not appear ill. No distress.   HENT:   Head: Normocephalic and atraumatic.   Right " Ear: Hearing, tympanic membrane, external ear and ear canal normal.   Left Ear: Hearing, tympanic membrane, external ear and ear canal normal.   Nose: No mucosal edema, rhinorrhea or nasal deformity. No epistaxis. Right sinus exhibits no maxillary sinus tenderness and no frontal sinus tenderness. Left sinus exhibits no maxillary sinus tenderness and no frontal sinus tenderness.   Mouth/Throat: Uvula is midline, oropharynx is clear and moist and mucous membranes are normal. No trismus in the jaw. Normal dentition. No uvula swelling. No posterior oropharyngeal erythema.   Sinus pressure  Tm clear bilaterally  Hoarse voice   Eyes: Conjunctivae and lids are normal. No scleral icterus.   Sclera clear bilat   Neck: Trachea normal, full passive range of motion without pain and phonation normal. Neck supple.   Cardiovascular: Regular rhythm, normal heart sounds, intact distal pulses and normal pulses.    Mild tachy   Pulmonary/Chest: Effort normal and breath sounds normal. No respiratory distress.   Intermittent coughing, dry and non productive   Abdominal: Soft. Normal appearance and bowel sounds are normal. There is no tenderness.   Musculoskeletal: Normal range of motion. She exhibits no edema or deformity.   Neurological: She is alert and oriented to person, place, and time. She exhibits normal muscle tone.   Skin: Skin is warm, dry and intact. She is not diaphoretic. No pallor.   Psychiatric: She has a normal mood and affect. Her speech is normal and behavior is normal. Cognition and memory are normal.   Nursing note and vitals reviewed.      Assessment:       1. Sinus headache    2. Migraine without aura and without status migrainosus, not intractable    3. URI, acute    4. Acute laryngitis        Plan:       Zaira was seen today for sinus problem.    Diagnoses and all orders for this visit:    Sinus headache    Migraine without aura and without status migrainosus, not intractable    URI, acute    Acute  laryngitis    Other orders  -     ketorolac injection 30 mg; Inject 1 mL (30 mg total) into the muscle one time.  -     betamethasone acetate-betamethasone sodium phosphate injection 6 mg; Inject 1 mL (6 mg total) into the muscle one time.  -     azithromycin (Z-HEMA) 250 MG tablet; Take 2 tablets by mouth on day 1; Take 1 tablet by mouth on days 2-5  -     promethazine-codeine 6.25-10 mg/5 ml (PHENERGAN WITH CODEINE) 6.25-10 mg/5 mL syrup; 10 ML PO QHS PRN COUGH AT NIGHT. WILL CAUSE SEDATION      Patient Instructions   REST AND HYDRATE WITH PLENTY OF FLUIDS  TORADOL 30 MG GIVEN IN CLINIC  CELESTONE 1 CC GIVEN IN CLINIC  TAKE MOTRIN OR TYLENOL FOR PAIN/FEVER/INFLAMMATION  ZPACK RX-ONLY FILL IF NOT MUCH BETTER IN A FEW DAYS  CONTINUE ADVIL COLD AND SINUS FOR CONGESTION/POST NASAL DRIP  ADD MUCINEX DM TWICE DAILY    SEE LARYNGITIS AND URI SHEET  FOLLOW UP WITH PCP NEXT WEEK IF NOT FULLY RESOLVED      Viral Upper Respiratory Illness (Adult)  You have a viral upper respiratory illness (URI), which is another term for the common cold. This illness is contagious during the first few days. It is spread through the air by coughing and sneezing. It may also be spread by direct contact (touching the sick person and then touching your own eyes, nose, or mouth). Frequent handwashing will decrease risk of spread. Most viral illnesses go away within 7 to 10 days with rest and simple home remedies. Sometimes the illness may last for several weeks. Antibiotics will not kill a virus, and they are generally not prescribed for this condition.    Home care  · If symptoms are severe, rest at home for the first 2 to 3 days. When you resume activity, don't let yourself get too tired.  · Avoid being exposed to cigarette smoke (yours or others).  · You may use acetaminophen or ibuprofen to control pain and fever, unless another medicine was prescribed. (Note: If you have chronic liver or kidney disease, have ever had a stomach ulcer or  gastrointestinal bleeding, or are taking blood-thinning medicines, talk with your healthcare provider before using these medicines.) Aspirin should never be given to anyone under 18 years of age who is ill with a viral infection or fever. It may cause severe liver or brain damage.  · Your appetite may be poor, so a light diet is fine. Avoid dehydration by drinking 6 to 8 glasses of fluids per day (water, soft drinks, juices, tea, or soup). Extra fluids will help loosen secretions in the nose and lungs.  · Over-the-counter cold medicines will not shorten the length of time youre sick, but they may be helpful for the following symptoms: cough, sore throat, and nasal and sinus congestion. (Note: Do not use decongestants if you have high blood pressure.)  Follow-up care  Follow up with your healthcare provider, or as advised.  When to seek medical advice  Call your healthcare provider right away if any of these occur:  · Cough with lots of colored sputum (mucus)  · Severe headache; face, neck, or ear pain  · Difficulty swallowing due to throat pain  · Fever of 100.4°F (38°C)  Call 911, or get immediate medical care  Call emergency services right away if any of these occur:  · Chest pain, shortness of breath, wheezing, or difficulty breathing  · Coughing up blood  · Inability to swallow due to throat pain  Date Last Reviewed: 9/13/2015 © 2000-2017 [facility]. [address] [place] [address]. All rights reserved. This information is not intended as a substitute for professional medical care. Always follow your healthcare professional's instructions.        Laryngitis    Laryngitis is a swelling of the tissues around the vocal cords. Symptoms include a hoarse (scratchy) voice. The voice may be lost completely. It may be caused by a viral illness, such as a head or chest cold. It may also be due to overuse and strain of the voice. Smoking, drinking alcohol, acid reflux, allergies, or inhaling  harsh chemicals may also lead to symptoms. This condition will usually resolve in 1-2 weeks.  Home care  · Rest your voice until it recovers. Talk as little as possible. If your symptoms are severe, rest at home for a day or so.  · Breathing cool steam from a humidifier/vaporizer or in a steamy shower may be helpful.  · Drink plenty of fluids to stay well hydrated.  · Do not smoke  Follow-up care  Follow up with your healthcare provider or this facility if you have not improved after one week.  When to seek medical advice  Contact your healthcare provider for any of the following:  · Severe pain with swallowing  · Trouble opening mouth  · Neck swelling, neck pain, or trouble moving neck  · Noisy breathing or trouble breathing  · Fever of 100.4°F (38.ºC) or higher, or as directed by your healthcare provider  · Drooling  · Symptoms do not resolve in 2 weeks  Date Last Reviewed: 4/26/2015  © 2109-6528 [employer]. [address] [place] [address], [place] [address]. All rights reserved. This information is not intended as a substitute for professional medical care. Always follow your healthcare professional's instructions.

## 2018-04-26 ENCOUNTER — TELEPHONE (OUTPATIENT)
Dept: URGENT CARE | Facility: CLINIC | Age: 23
End: 2018-04-26

## 2020-08-02 ENCOUNTER — NURSE TRIAGE (OUTPATIENT)
Dept: ADMINISTRATIVE | Facility: CLINIC | Age: 25
End: 2020-08-02

## 2020-08-02 NOTE — TELEPHONE ENCOUNTER
RN contacted pt through the Covid Home Symptom Monitoring Program.  Pt reported that she has been having seizures, migraines, fever, tachypnea and wheezing.  Pt stated that she is feeling better today and has already gone to the hospital within the last day to be evaluated.  She was also dx with pneumonia.  Home care advised at this time.  Pt instructed to take her ABX, anticonvulsants, migraine meds, inhalers and nebs as prescribed.  Pt instructed to increase her fluid intake, eat healthy meals, get lots of rest, take Vitamin C and also Tylenol ATC to keep her fever under control.  Pt advised to call [facility] if she has any further questions/concerns or contact her provider.  If symptoms should worsen, pt instructed to go to the nearest ED immediately.  Pt verbalized understanding to all.    Reason for Disposition   [1] Seizure lasting < 5 minutes AND [2] history of prior seizure(s) AND [3] taking anticonvulsants   [1] COVID-19 diagnosed by HCP (doctor, NP or PA) AND [2] mild symptoms (e.g., cough, fever, others) AND [3] no complications or SOB    Additional Information   Negative: SEVERE difficulty breathing (e.g., struggling for each breath, speaks in single words)   Negative: Difficult to awaken or acting confused (e.g., disoriented, slurred speech)   Negative: Bluish (or gray) lips or face now   Negative: Shock suspected (e.g., cold/pale/clammy skin, too weak to stand, low BP, rapid pulse)   Negative: Sounds like a life-threatening emergency to the triager   Negative: [1] COVID-19 exposure AND [2] NO symptoms   Negative: COVID-19 and Breastfeeding, questions about   Negative: [1] Adult with possible COVID-19 symptoms AND [2] triager concerned about severity of symptoms or other causes   Negative: SEVERE or constant chest pain or pressure (Exception: mild central chest pain, present only when coughing)   Negative: MODERATE difficulty breathing (e.g., speaks in phrases, SOB even at rest, pulse 100-120)    Negative: MILD difficulty breathing (e.g., minimal/no SOB at rest, SOB with walking, pulse <100)   Negative: Chest pain or pressure   Negative: Fever > 103 F (39.4 C)   Negative: [1] Fever > 101 F (38.3 C) AND [2] age > 60   Negative: [1] Fever > 100.0 F (37.8 C) AND [2] bedridden (e.g., nursing home patient, CVA, chronic illness, recovering from surgery)   Negative: HIGH RISK patient (e.g., age > 64 years, diabetes, heart or lung disease, weak immune system)   Negative: Fever present > 3 days (72 hours)   Negative: [1] Fever returns after gone for over 24 hours AND [2] symptoms worse or not improved   Negative: [1] Continuous (nonstop) coughing interferes with work or school AND [2] no improvement using cough treatment per protocol   Negative: [1] COVID-19 infection suspected by caller or triager AND [2] mild symptoms (cough, fever, or others) AND [3] no complications or SOB   Negative: Cough present > 3 weeks    Protocols used: [id number], CORONAVIRUS (COVID-19) DIAGNOSED OR [id number]

## 2021-03-22 PROBLEM — G25.81 RESTLESS LEG SYNDROME: Status: ACTIVE | Noted: 2021-03-22

## 2021-03-22 PROBLEM — G43.009 MIGRAINE WITHOUT AURA AND WITHOUT STATUS MIGRAINOSUS, NOT INTRACTABLE: Status: ACTIVE | Noted: 2021-03-22

## 2021-03-22 PROBLEM — F90.8 ADHD, ADULT RESIDUAL TYPE: Status: ACTIVE | Noted: 2021-03-22

## 2021-03-22 PROBLEM — F32.A DEPRESSION: Status: ACTIVE | Noted: 2021-03-22

## 2021-03-22 PROBLEM — R05.9 COUGH: Status: ACTIVE | Noted: 2021-03-22

## 2021-03-22 PROBLEM — Z00.00 HEALTHCARE MAINTENANCE: Status: ACTIVE | Noted: 2021-03-22

## 2021-03-22 PROBLEM — R11.0 NAUSEA: Status: ACTIVE | Noted: 2021-03-22

## 2021-03-22 PROBLEM — J45.909 ASTHMA: Status: ACTIVE | Noted: 2021-03-22

## 2021-03-22 PROBLEM — F41.0 PANIC ATTACKS: Status: ACTIVE | Noted: 2021-03-22

## 2021-03-22 PROBLEM — R03.0 ELEVATED BLOOD PRESSURE READING: Status: ACTIVE | Noted: 2021-03-22

## 2021-03-22 PROBLEM — G47.00 INSOMNIA: Status: ACTIVE | Noted: 2021-03-22

## 2021-03-22 PROBLEM — Z76.89 ENCOUNTER TO ESTABLISH CARE: Status: ACTIVE | Noted: 2021-03-22

## 2021-05-06 PROBLEM — I10 ESSENTIAL HYPERTENSION: Status: ACTIVE | Noted: 2021-05-06

## 2021-05-19 ENCOUNTER — OFFICE VISIT (OUTPATIENT)
Dept: URGENT CARE | Facility: CLINIC | Age: 26
End: 2021-05-19
Payer: MEDICAID

## 2021-05-19 VITALS
RESPIRATION RATE: 18 BRPM | HEART RATE: 116 BPM | TEMPERATURE: 98 F | BODY MASS INDEX: 38.61 KG/M2 | HEIGHT: 67 IN | WEIGHT: 246 LBS | SYSTOLIC BLOOD PRESSURE: 114 MMHG | OXYGEN SATURATION: 95 % | DIASTOLIC BLOOD PRESSURE: 84 MMHG

## 2021-05-19 DIAGNOSIS — R19.7 DIARRHEA, UNSPECIFIED TYPE: ICD-10-CM

## 2021-05-19 DIAGNOSIS — B34.9 VIREMIA: ICD-10-CM

## 2021-05-19 DIAGNOSIS — R50.9 FEVER, UNSPECIFIED FEVER CAUSE: Primary | ICD-10-CM

## 2021-05-19 LAB
CTP QC/QA: YES
SARS-COV-2 RDRP RESP QL NAA+PROBE: NEGATIVE

## 2021-05-19 PROCEDURE — 99213 OFFICE O/P EST LOW 20 MIN: CPT | Mod: S$GLB,,, | Performed by: FAMILY MEDICINE

## 2021-05-19 PROCEDURE — U0002 COVID-19 LAB TEST NON-CDC: HCPCS | Mod: QW,S$GLB,, | Performed by: FAMILY MEDICINE

## 2021-05-19 PROCEDURE — U0002: ICD-10-PCS | Mod: QW,S$GLB,, | Performed by: FAMILY MEDICINE

## 2021-05-19 PROCEDURE — 99213 PR OFFICE/OUTPT VISIT, EST, LEVL III, 20-29 MIN: ICD-10-PCS | Mod: S$GLB,,, | Performed by: FAMILY MEDICINE

## 2021-06-21 PROBLEM — Z00.00 HEALTHCARE MAINTENANCE: Status: RESOLVED | Noted: 2021-03-22 | Resolved: 2021-06-21

## 2021-07-02 ENCOUNTER — PATIENT OUTREACH (OUTPATIENT)
Dept: ADMINISTRATIVE | Facility: HOSPITAL | Age: 26
End: 2021-07-02

## 2021-09-27 PROBLEM — F32.A DEPRESSION: Status: RESOLVED | Noted: 2021-03-22 | Resolved: 2021-09-27

## 2021-09-27 PROBLEM — F31.81 BIPOLAR 2 DISORDER: Status: ACTIVE | Noted: 2021-09-27

## 2021-11-12 PROBLEM — G43.909 MIGRAINE: Status: ACTIVE | Noted: 2018-11-06

## 2021-11-12 PROBLEM — F90.9 ATTENTION DEFICIT HYPERACTIVITY DISORDER: Status: ACTIVE | Noted: 2018-11-06

## 2021-11-12 PROBLEM — E03.9 HYPOTHYROID: Status: ACTIVE | Noted: 2021-11-12

## 2021-11-12 PROBLEM — F41.9 ANXIETY: Status: ACTIVE | Noted: 2018-11-06

## 2021-11-12 PROBLEM — K21.9 GASTROESOPHAGEAL REFLUX DISEASE: Status: ACTIVE | Noted: 2021-11-12

## 2021-11-12 PROBLEM — J45.51 SEVERE PERSISTENT ASTHMA WITH EXACERBATION: Status: ACTIVE | Noted: 2019-03-31

## 2021-11-12 PROBLEM — F51.01 PRIMARY INSOMNIA: Status: ACTIVE | Noted: 2021-03-22

## 2021-11-12 PROBLEM — Z86.711 PERSONAL HISTORY OF PULMONARY EMBOLISM: Status: ACTIVE | Noted: 2021-11-12

## 2021-11-12 PROBLEM — F45.8 BRUXISM (TEETH GRINDING): Status: ACTIVE | Noted: 2021-11-12

## 2021-11-12 PROBLEM — R56.9 SEIZURE: Status: ACTIVE | Noted: 2018-11-06

## 2021-11-12 PROBLEM — F90.9 ADULT ADHD: Status: ACTIVE | Noted: 2021-03-22

## 2022-03-14 PROBLEM — F41.9 ANXIETY: Chronic | Status: ACTIVE | Noted: 2018-11-06

## 2022-03-14 PROBLEM — F31.81 BIPOLAR 2 DISORDER: Chronic | Status: ACTIVE | Noted: 2021-09-27

## 2022-03-14 PROBLEM — F90.9 ADULT ADHD: Chronic | Status: ACTIVE | Noted: 2021-03-22

## 2022-04-27 PROBLEM — G47.00 INSOMNIA: Chronic | Status: ACTIVE | Noted: 2021-03-22

## 2022-04-27 PROBLEM — G25.81 RESTLESS LEGS: Chronic | Status: ACTIVE | Noted: 2021-03-22

## 2022-04-27 PROBLEM — F41.9 ANXIETY: Chronic | Status: RESOLVED | Noted: 2018-11-06 | Resolved: 2022-04-27

## 2022-07-27 PROBLEM — D64.9 ANEMIA: Status: ACTIVE | Noted: 2022-03-31

## 2022-07-27 PROBLEM — G43.911 INTRACTABLE MIGRAINE WITH STATUS MIGRAINOSUS: Status: ACTIVE | Noted: 2018-11-06

## 2022-07-27 PROBLEM — Z34.90 PREGNANCY: Status: ACTIVE | Noted: 2022-07-27

## 2022-07-27 PROBLEM — R03.0 ELEVATED BLOOD PRESSURE READING: Status: RESOLVED | Noted: 2021-03-22 | Resolved: 2022-07-27

## 2022-07-27 PROBLEM — G40.909 SEIZURE DISORDER: Status: ACTIVE | Noted: 2018-11-06

## 2022-07-27 PROBLEM — Z34.90 PREGNANCY: Chronic | Status: ACTIVE | Noted: 2022-07-27

## 2022-07-27 PROBLEM — G40.909 SEIZURE DISORDER: Chronic | Status: ACTIVE | Noted: 2018-11-06

## 2022-07-27 PROBLEM — R05.9 COUGH: Status: RESOLVED | Noted: 2021-03-22 | Resolved: 2022-07-27

## 2022-09-20 ENCOUNTER — SOCIAL WORK (OUTPATIENT)
Dept: ADMINISTRATIVE | Facility: OTHER | Age: 27
End: 2022-09-20
Payer: MEDICAID

## 2022-09-20 NOTE — PROGRESS NOTES
SW met with pt regarding initial OB assessment. Pt stated this is her 1st pregnancy/0-miscarriage. Pt stated lives alone and able to perform ADL's independently. Pt stated does not work is currently on disability. Pt stated support system is her mother/Quiana. Pt stated has medicaid([facility]). Pt stated does not have WIC. Pt stated is going to breastfeed. SW provide pt with information on other community resources.SW faxed and scanned pt's notification of pregnancy into epic.  No other needs identified at this time.    Teetee Bradley,MSW  Pager#[phone]

## 2022-09-23 PROBLEM — E03.9 HYPOTHYROID: Status: RESOLVED | Noted: 2021-11-12 | Resolved: 2022-09-23

## 2022-09-23 PROBLEM — D64.9 ANEMIA: Status: RESOLVED | Noted: 2022-03-31 | Resolved: 2022-09-23

## 2022-09-23 PROBLEM — G25.81 RESTLESS LEGS: Chronic | Status: RESOLVED | Noted: 2021-03-22 | Resolved: 2022-09-23

## 2022-09-23 PROBLEM — Z76.89 ENCOUNTER TO ESTABLISH CARE: Status: RESOLVED | Noted: 2021-03-22 | Resolved: 2022-09-23

## 2022-09-23 PROBLEM — Z86.711 PERSONAL HISTORY OF PULMONARY EMBOLISM: Status: RESOLVED | Noted: 2021-11-12 | Resolved: 2022-09-23

## 2022-09-23 PROBLEM — K21.9 GASTROESOPHAGEAL REFLUX DISEASE: Status: RESOLVED | Noted: 2021-11-12 | Resolved: 2022-09-23

## 2022-09-23 PROBLEM — R11.0 NAUSEA: Status: RESOLVED | Noted: 2021-03-22 | Resolved: 2022-09-23

## 2022-09-23 PROBLEM — J45.51 SEVERE PERSISTENT ASTHMA WITH EXACERBATION: Status: RESOLVED | Noted: 2019-03-31 | Resolved: 2022-09-23

## 2022-09-23 PROBLEM — F45.8 BRUXISM (TEETH GRINDING): Status: RESOLVED | Noted: 2021-11-12 | Resolved: 2022-09-23

## 2022-10-10 PROBLEM — Z67.91 RH NEGATIVE STATE IN ANTEPARTUM PERIOD, THIRD TRIMESTER: Status: ACTIVE | Noted: 2022-10-10

## 2022-10-10 PROBLEM — G43.909 MIGRAINES: Status: ACTIVE | Noted: 2022-10-10

## 2022-10-10 PROBLEM — E87.6 HYPOKALEMIA: Status: ACTIVE | Noted: 2022-10-10

## 2022-10-10 PROBLEM — R55 SYNCOPE: Status: ACTIVE | Noted: 2022-10-10

## 2022-10-10 PROBLEM — O99.013 ANEMIA AFFECTING PREGNANCY IN THIRD TRIMESTER: Status: ACTIVE | Noted: 2022-10-10

## 2022-10-10 PROBLEM — O26.893 RH NEGATIVE STATE IN ANTEPARTUM PERIOD, THIRD TRIMESTER: Status: ACTIVE | Noted: 2022-10-10

## 2022-10-25 PROBLEM — O09.93 SUPERVISION OF HIGH-RISK PREGNANCY, THIRD TRIMESTER: Status: ACTIVE | Noted: 2022-10-25

## 2022-10-26 PROBLEM — O10.919 CHRONIC HYPERTENSION AFFECTING PREGNANCY: Status: ACTIVE | Noted: 2022-10-26

## 2022-10-26 PROBLEM — R51.9 PREGNANCY HEADACHE IN THIRD TRIMESTER: Status: ACTIVE | Noted: 2022-10-26

## 2022-10-26 PROBLEM — O26.899 SHORTNESS OF BREATH DUE TO PREGNANCY: Status: ACTIVE | Noted: 2022-10-26

## 2022-10-26 PROBLEM — O26.893 PREGNANCY HEADACHE IN THIRD TRIMESTER: Status: ACTIVE | Noted: 2022-10-26

## 2022-10-26 PROBLEM — R06.02 SHORTNESS OF BREATH DUE TO PREGNANCY: Status: ACTIVE | Noted: 2022-10-26

## 2022-10-26 PROBLEM — O36.8130 DECREASED FETAL MOVEMENT DURING PREGNANCY IN THIRD TRIMESTER, ANTEPARTUM: Status: ACTIVE | Noted: 2022-10-26

## 2022-11-08 PROBLEM — O11.9 CHRONIC HYPERTENSION WITH SUPERIMPOSED PREECLAMPSIA: Status: ACTIVE | Noted: 2022-11-08

## 2022-11-08 PROBLEM — O11.9 CHRONIC HYPERTENSION WITH SUPERIMPOSED PREECLAMPSIA: Status: RESOLVED | Noted: 2022-11-08 | Resolved: 2022-11-08

## 2022-11-09 PROBLEM — O14.13 SEVERE PRE-ECLAMPSIA IN THIRD TRIMESTER: Status: ACTIVE | Noted: 2022-11-09

## 2022-11-10 PROBLEM — Z37.9 VACUUM-ASSISTED VAGINAL DELIVERY: Status: ACTIVE | Noted: 2022-11-10

## 2022-11-11 PROBLEM — D62 ACUTE BLOOD LOSS ANEMIA: Status: ACTIVE | Noted: 2022-11-11

## 2022-11-11 PROBLEM — R51.9 PREGNANCY HEADACHE IN THIRD TRIMESTER: Status: RESOLVED | Noted: 2022-10-26 | Resolved: 2022-11-11

## 2022-11-11 PROBLEM — O26.893 PREGNANCY HEADACHE IN THIRD TRIMESTER: Status: RESOLVED | Noted: 2022-10-26 | Resolved: 2022-11-11

## 2022-11-11 PROBLEM — O10.919 CHRONIC HYPERTENSION AFFECTING PREGNANCY: Status: RESOLVED | Noted: 2022-10-26 | Resolved: 2022-11-11

## 2022-12-08 DIAGNOSIS — U07.1 COVID-19 VIRUS DETECTED: ICD-10-CM

## 2022-12-19 PROBLEM — O09.93 SUPERVISION OF HIGH-RISK PREGNANCY, THIRD TRIMESTER: Status: RESOLVED | Noted: 2022-10-25 | Resolved: 2022-12-19

## 2022-12-19 PROBLEM — Z67.91 RH NEGATIVE STATE IN ANTEPARTUM PERIOD, THIRD TRIMESTER: Status: RESOLVED | Noted: 2022-10-10 | Resolved: 2022-12-19

## 2022-12-19 PROBLEM — R06.02 SHORTNESS OF BREATH DUE TO PREGNANCY: Status: RESOLVED | Noted: 2022-10-26 | Resolved: 2022-12-19

## 2022-12-19 PROBLEM — O36.8130 DECREASED FETAL MOVEMENT DURING PREGNANCY IN THIRD TRIMESTER, ANTEPARTUM: Status: RESOLVED | Noted: 2022-10-26 | Resolved: 2022-12-19

## 2022-12-19 PROBLEM — Z34.90 PREGNANCY: Chronic | Status: RESOLVED | Noted: 2022-07-27 | Resolved: 2022-12-19

## 2022-12-19 PROBLEM — Z37.9 VACUUM-ASSISTED VAGINAL DELIVERY: Status: RESOLVED | Noted: 2022-11-10 | Resolved: 2022-12-19

## 2022-12-19 PROBLEM — O99.013 ANEMIA AFFECTING PREGNANCY IN THIRD TRIMESTER: Status: RESOLVED | Noted: 2022-10-10 | Resolved: 2022-12-19

## 2022-12-19 PROBLEM — O26.899 SHORTNESS OF BREATH DUE TO PREGNANCY: Status: RESOLVED | Noted: 2022-10-26 | Resolved: 2022-12-19

## 2022-12-19 PROBLEM — O26.893 RH NEGATIVE STATE IN ANTEPARTUM PERIOD, THIRD TRIMESTER: Status: RESOLVED | Noted: 2022-10-10 | Resolved: 2022-12-19

## 2022-12-27 ENCOUNTER — OFFICE VISIT (OUTPATIENT)
Dept: PRIMARY CARE CLINIC | Facility: CLINIC | Age: 27
End: 2022-12-27
Payer: MEDICAID

## 2022-12-27 DIAGNOSIS — I26.99 ACUTE PULMONARY EMBOLISM WITHOUT ACUTE COR PULMONALE, UNSPECIFIED PULMONARY EMBOLISM TYPE: Primary | ICD-10-CM

## 2022-12-27 DIAGNOSIS — H66.91 RIGHT OTITIS MEDIA, UNSPECIFIED OTITIS MEDIA TYPE: ICD-10-CM

## 2022-12-27 DIAGNOSIS — U07.1 COVID-19 VIRUS INFECTION: ICD-10-CM

## 2022-12-27 PROCEDURE — 99203 PR OFFICE/OUTPT VISIT, NEW, LEVL III, 30-44 MIN: ICD-10-PCS | Mod: 95,,, | Performed by: FAMILY MEDICINE

## 2022-12-27 PROCEDURE — 3044F HG A1C LEVEL LT 7.0%: CPT | Mod: CPTII,95,, | Performed by: FAMILY MEDICINE

## 2022-12-27 PROCEDURE — 1160F RVW MEDS BY RX/DR IN RCRD: CPT | Mod: CPTII,95,, | Performed by: FAMILY MEDICINE

## 2022-12-27 PROCEDURE — 1160F PR REVIEW ALL MEDS BY PRESCRIBER/CLIN PHARMACIST DOCUMENTED: ICD-10-PCS | Mod: CPTII,95,, | Performed by: FAMILY MEDICINE

## 2022-12-27 PROCEDURE — 3044F PR MOST RECENT HEMOGLOBIN A1C LEVEL <7.0%: ICD-10-PCS | Mod: CPTII,95,, | Performed by: FAMILY MEDICINE

## 2022-12-27 PROCEDURE — 1159F PR MEDICATION LIST DOCUMENTED IN MEDICAL RECORD: ICD-10-PCS | Mod: CPTII,95,, | Performed by: FAMILY MEDICINE

## 2022-12-27 PROCEDURE — 99203 OFFICE O/P NEW LOW 30 MIN: CPT | Mod: 95,,, | Performed by: FAMILY MEDICINE

## 2022-12-27 PROCEDURE — 1159F MED LIST DOCD IN RCRD: CPT | Mod: CPTII,95,, | Performed by: FAMILY MEDICINE

## 2022-12-27 NOTE — PROGRESS NOTES
The patient location is: [place]  The chief complaint leading to consultation is: Covid    Visit type: audiovisual    Face to Face time with patient:   15 minutes of total time spent on the encounter, which includes face to face time and non-face to face time preparing to see the patient (eg, review of tests), Obtaining and/or reviewing separately obtained history, Documenting clinical information in the electronic or other health record, Independently interpreting results (not separately reported) and communicating results to the patient/family/caregiver, or Care coordination (not separately reported).         Each patient to whom he or she provides medical services by telemedicine is:  (1) informed of the relationship between the physician and patient and the respective role of any other health care provider with respect to management of the patient; and (2) notified that he or she may decline to receive medical services by telemedicine and may withdraw from such care at any time.    Notes:    History of present illness: Patient is a 27 year-old female who tested positive for COVID initially on 12/08 and again on 12/26 requesting a note to return to work.  She developed complications of a PE on yesterday.  Patient states she passed out while in the doctor's office and was further evaluated at the hospital.  She was placed on Lovenox and a referral to Hematology was placed.  She is requesting a note for work.  She was also found to have a right otitis media on yesterday and reports a persistent fever of 103.2  Review of systems:  See patient answers submitted below  Answers submitted by the patient for this visit:  Review of Systems Questionnaire (Submitted on 12/27/2022)  activity change: No  unexpected weight change: No  neck pain: No  hearing loss: No  rhinorrhea: Yes  trouble swallowing: No  eye discharge: No  visual disturbance: No  chest tightness: Yes  wheezing: Yes  chest pain: No  palpitations: No  blood in  stool: No  constipation: No  vomiting: No  diarrhea: No  polydipsia: No  polyuria: No  difficulty urinating: No  hematuria: No  menstrual problem: No  dysuria: No  joint swelling: No  arthralgias: No  headaches: Yes  weakness: No  confusion: No  dysphoric mood: No  Past Medical History:   Diagnosis Date    ADHD (attention deficit hyperactivity disorder)     Anemia 03/31/2022    Formatting of this note might be different from the original. New problem.Workup needed. Unclear etiology .  Ordering labs for further work-up    Anxiety     Asthma     Bruxism (teeth grinding) 11/12/2021    Cough 03/22/2021    Depression     Elevated blood pressure reading 03/22/2021    Encounter to establish care 03/22/2021    Gastroesophageal reflux disease 11/12/2021    HLD (hyperlipidemia)     HTN (hypertension)     Hypothyroid     Insomnia     Migraines     Nausea 03/22/2021    Personal history of pulmonary embolism 11/12/2021    Restless leg     Restless legs 03/22/2021    Seizures     Severe persistent asthma with exacerbation 03/31/2019    Severe pre-eclampsia, postpartum 11/9/2022      Physical exam:  General: Alert, no acute distress   HEENT:  NC/AT, no facial swelling of deformity  Neck:  supple  Resp:  Normal effort, no respiratory distress   Psych:  Normal affect     Diagnoses and all orders for this visit:    Acute pulmonary embolism without acute cor pulmonale, unspecified pulmonary embolism type  Note written to return to work on January 2, 2023  Patient is to continue Lovenox.  She is a follow-up appointment with PCP next week and referral has been placed to Hematology    COVID-19 virus infection    Right otitis media, unspecified otitis media type

## 2023-01-04 PROBLEM — D62 ACUTE BLOOD LOSS ANEMIA: Status: RESOLVED | Noted: 2022-11-11 | Resolved: 2023-01-04

## 2023-01-04 PROBLEM — R55 SYNCOPE: Status: RESOLVED | Noted: 2022-10-10 | Resolved: 2023-01-04

## 2023-01-04 PROBLEM — E87.6 HYPOKALEMIA: Status: RESOLVED | Noted: 2022-10-10 | Resolved: 2023-01-04

## 2023-03-02 ENCOUNTER — PATIENT MESSAGE (OUTPATIENT)
Dept: RESEARCH | Facility: HOSPITAL | Age: 28
End: 2023-03-02
Payer: MEDICAID

## 2023-03-02 PROBLEM — M26.623 BILATERAL TEMPOROMANDIBULAR JOINT PAIN: Status: ACTIVE | Noted: 2023-03-02

## 2023-03-17 ENCOUNTER — PATIENT MESSAGE (OUTPATIENT)
Dept: RESEARCH | Facility: HOSPITAL | Age: 28
End: 2023-03-17
Payer: MEDICAID

## 2023-03-22 PROBLEM — M26.623 ARTHRALGIA OF BILATERAL TEMPOROMANDIBULAR JOINT: Status: ACTIVE | Noted: 2023-03-22

## 2023-05-09 PROBLEM — G43.709 CHRONIC MIGRAINE WITHOUT AURA WITHOUT STATUS MIGRAINOSUS, NOT INTRACTABLE: Status: ACTIVE | Noted: 2023-05-09

## 2023-05-09 PROBLEM — R42 DIZZINESS: Status: ACTIVE | Noted: 2023-05-09

## 2023-05-18 PROBLEM — D50.9 IRON DEFICIENCY ANEMIA: Status: ACTIVE | Noted: 2023-05-18

## 2023-05-25 PROBLEM — M26.69 OTHER SPECIFIED DISORDERS OF TEMPOROMANDIBULAR JOINT: Status: ACTIVE | Noted: 2023-05-25

## 2024-09-13 ENCOUNTER — PATIENT MESSAGE (OUTPATIENT)
Dept: RESEARCH | Facility: OTHER | Age: 29
End: 2024-09-13